# Patient Record
Sex: MALE | Race: OTHER | Employment: OTHER | URBAN - METROPOLITAN AREA
[De-identification: names, ages, dates, MRNs, and addresses within clinical notes are randomized per-mention and may not be internally consistent; named-entity substitution may affect disease eponyms.]

---

## 2023-01-01 LAB
BUN BLDV-MCNC: 12 MG/DL
CALCIUM SERPL-MCNC: 8.8 MG/DL
CHLORIDE BLD-SCNC: 96 MMOL/L
CO2: 27 MMOL/L
CREAT SERPL-MCNC: 0.5 MG/DL
EGFR: NORMAL
GLUCOSE BLD-MCNC: 95 MG/DL
POTASSIUM SERPL-SCNC: 4.4 MMOL/L
SODIUM BLD-SCNC: 132 MMOL/L

## 2023-11-24 ENCOUNTER — APPOINTMENT (OUTPATIENT)
Dept: GENERAL RADIOLOGY | Age: 83
End: 2023-11-24
Payer: MEDICARE

## 2023-11-24 ENCOUNTER — HOSPITAL ENCOUNTER (INPATIENT)
Age: 83
LOS: 7 days | Discharge: NURSING, REHAB OR LONG-TERM CARE | End: 2023-12-01
Attending: EMERGENCY MEDICINE | Admitting: INTERNAL MEDICINE
Payer: MEDICARE

## 2023-11-24 ENCOUNTER — APPOINTMENT (OUTPATIENT)
Dept: CT IMAGING | Age: 83
End: 2023-11-24
Attending: EMERGENCY MEDICINE
Payer: MEDICARE

## 2023-11-24 DIAGNOSIS — R41.82 ALTERED MENTAL STATUS, UNSPECIFIED ALTERED MENTAL STATUS TYPE: Primary | ICD-10-CM

## 2023-11-24 DIAGNOSIS — E16.2 HYPOGLYCEMIA: ICD-10-CM

## 2023-11-24 DIAGNOSIS — R06.03 RESPIRATORY DISTRESS: ICD-10-CM

## 2023-11-24 DIAGNOSIS — I48.91 ATRIAL FIBRILLATION WITH RAPID VENTRICULAR RESPONSE (HCC): ICD-10-CM

## 2023-11-24 DIAGNOSIS — J96.91 RESPIRATORY FAILURE WITH HYPOXIA, UNSPECIFIED CHRONICITY (HCC): ICD-10-CM

## 2023-11-24 LAB
ALBUMIN SERPL-MCNC: 3.4 G/DL (ref 3.5–4.6)
ALP SERPL-CCNC: 137 U/L (ref 35–104)
ALT SERPL-CCNC: 23 U/L (ref 0–41)
AMPHET UR QL SCN: ABNORMAL
ANION GAP SERPL CALCULATED.3IONS-SCNC: 18 MEQ/L (ref 9–15)
AST SERPL-CCNC: 68 U/L (ref 0–40)
BACTERIA URNS QL MICRO: NEGATIVE /HPF
BARBITURATES UR QL SCN: ABNORMAL
BASE EXCESS ARTERIAL: -6 (ref -3–3)
BASOPHILS # BLD: 0 K/UL (ref 0–0.2)
BASOPHILS NFR BLD: 0.2 %
BENZODIAZ UR QL SCN: ABNORMAL
BILIRUB SERPL-MCNC: 1.2 MG/DL (ref 0.2–0.7)
BILIRUB UR QL STRIP: ABNORMAL
BUN SERPL-MCNC: 22 MG/DL (ref 8–23)
CALCIUM IONIZED: 1.27 MMOL/L (ref 1.12–1.32)
CALCIUM SERPL-MCNC: 9.6 MG/DL (ref 8.5–9.9)
CANNABINOIDS UR QL SCN: ABNORMAL
CHLORIDE SERPL-SCNC: 101 MEQ/L (ref 95–107)
CLARITY UR: CLEAR
CO2 SERPL-SCNC: 19 MEQ/L (ref 20–31)
COARSE GRAN CASTS #/AREA URNS LPF: ABNORMAL /LPF (ref 0–5)
COCAINE UR QL SCN: ABNORMAL
COLOR UR: ABNORMAL
CREAT SERPL-MCNC: 0.82 MG/DL (ref 0.7–1.2)
DRUG SCREEN COMMENT UR-IMP: ABNORMAL
EOSINOPHIL # BLD: 0 K/UL (ref 0–0.7)
EOSINOPHIL NFR BLD: 0.1 %
EPI CELLS #/AREA URNS AUTO: ABNORMAL /HPF (ref 0–5)
ERYTHROCYTE [DISTWIDTH] IN BLOOD BY AUTOMATED COUNT: 15.6 % (ref 11.5–14.5)
FENTANYL SCREEN, URINE: POSITIVE
GLOBULIN SER CALC-MCNC: 4.4 G/DL (ref 2.3–3.5)
GLUCOSE BLD-MCNC: 107 MG/DL (ref 70–99)
GLUCOSE BLD-MCNC: 116 MG/DL (ref 70–99)
GLUCOSE BLD-MCNC: 204 MG/DL (ref 70–99)
GLUCOSE SERPL-MCNC: 192 MG/DL (ref 70–99)
GLUCOSE UR STRIP-MCNC: 250 MG/DL
HCO3 ARTERIAL: 20.2 MMOL/L (ref 21–29)
HCT VFR BLD AUTO: 41.9 % (ref 42–52)
HCT VFR BLD AUTO: 42 % (ref 41–53)
HGB BLD CALC-MCNC: 14.2 GM/DL (ref 13.5–17.5)
HGB BLD-MCNC: 13.2 G/DL (ref 14–18)
HGB UR QL STRIP: ABNORMAL
HYALINE CASTS #/AREA URNS AUTO: ABNORMAL /HPF (ref 0–5)
INR PPP: 1.3
KETONES UR STRIP-MCNC: 15 MG/DL
LACTATE BLDV-SCNC: 2.2 MMOL/L (ref 0.5–2.2)
LACTATE: 1.2 MMOL/L (ref 0.4–2)
LEUKOCYTE ESTERASE UR QL STRIP: ABNORMAL
LYMPHOCYTES # BLD: 0.8 K/UL (ref 1–4.8)
LYMPHOCYTES NFR BLD: 8.8 %
MCH RBC QN AUTO: 29.9 PG (ref 27–31.3)
MCHC RBC AUTO-ENTMCNC: 31.5 % (ref 33–37)
MCV RBC AUTO: 94.8 FL (ref 79–92.2)
METHADONE UR QL SCN: ABNORMAL
MONOCYTES # BLD: 0.7 K/UL (ref 0.2–0.8)
MONOCYTES NFR BLD: 7.9 %
NEUTROPHILS # BLD: 7.5 K/UL (ref 1.4–6.5)
NEUTS SEG NFR BLD: 82.7 %
NITRITE UR QL STRIP: NEGATIVE
O2 SAT, ARTERIAL: 100 % (ref 93–100)
OPIATES UR QL SCN: ABNORMAL
OXYCODONE UR QL SCN: ABNORMAL
PCO2 ARTERIAL: 39 MM HG (ref 35–45)
PCP UR QL SCN: ABNORMAL
PERFORMED ON: ABNORMAL
PH ARTERIAL: 7.33 (ref 7.35–7.45)
PH UR STRIP: 5 [PH] (ref 5–9)
PLATELET # BLD AUTO: 235 K/UL (ref 130–400)
PO2 ARTERIAL: 448 MM HG (ref 75–108)
POC CHLORIDE: 109 MEQ/L (ref 99–110)
POC CREATININE: 0.6 MG/DL (ref 0.8–1.3)
POC SAMPLE TYPE: ABNORMAL
POTASSIUM SERPL-SCNC: 3.9 MEQ/L (ref 3.5–5.1)
POTASSIUM SERPL-SCNC: 4.2 MEQ/L (ref 3.4–4.9)
PROCALCITONIN SERPL IA-MCNC: 0.11 NG/ML (ref 0–0.15)
PROPOXYPH UR QL SCN: ABNORMAL
PROT SERPL-MCNC: 7.8 G/DL (ref 6.3–8)
PROT UR STRIP-MCNC: 100 MG/DL
PROTHROMBIN TIME: 16.7 SEC (ref 12.3–14.9)
RBC # BLD AUTO: 4.42 M/UL (ref 4.7–6.1)
RBC #/AREA URNS HPF: ABNORMAL /HPF (ref 0–2)
SODIUM BLD-SCNC: 139 MEQ/L (ref 136–145)
SODIUM SERPL-SCNC: 138 MEQ/L (ref 135–144)
SP GR UR STRIP: 1.02 (ref 1–1.03)
TCO2 ARTERIAL: 21 MMOL/L (ref 21–32)
TROPONIN, HIGH SENSITIVITY: 26 NG/L (ref 0–19)
URINE REFLEX TO CULTURE: YES
UROBILINOGEN UR STRIP-ACNC: 1 E.U./DL
WBC # BLD AUTO: 9.1 K/UL (ref 4.8–10.8)
WBC #/AREA URNS AUTO: ABNORMAL /HPF (ref 0–5)

## 2023-11-24 PROCEDURE — 81001 URINALYSIS AUTO W/SCOPE: CPT

## 2023-11-24 PROCEDURE — 82435 ASSAY OF BLOOD CHLORIDE: CPT

## 2023-11-24 PROCEDURE — 87086 URINE CULTURE/COLONY COUNT: CPT

## 2023-11-24 PROCEDURE — 93005 ELECTROCARDIOGRAM TRACING: CPT | Performed by: EMERGENCY MEDICINE

## 2023-11-24 PROCEDURE — 84295 ASSAY OF SERUM SODIUM: CPT

## 2023-11-24 PROCEDURE — 6370000000 HC RX 637 (ALT 250 FOR IP): Performed by: INTERNAL MEDICINE

## 2023-11-24 PROCEDURE — 82565 ASSAY OF CREATININE: CPT

## 2023-11-24 PROCEDURE — 2000000000 HC ICU R&B

## 2023-11-24 PROCEDURE — 2500000003 HC RX 250 WO HCPCS: Performed by: INTERNAL MEDICINE

## 2023-11-24 PROCEDURE — 36415 COLL VENOUS BLD VENIPUNCTURE: CPT

## 2023-11-24 PROCEDURE — 94640 AIRWAY INHALATION TREATMENT: CPT

## 2023-11-24 PROCEDURE — 2700000000 HC OXYGEN THERAPY PER DAY

## 2023-11-24 PROCEDURE — 2500000003 HC RX 250 WO HCPCS

## 2023-11-24 PROCEDURE — 85025 COMPLETE CBC W/AUTO DIFF WBC: CPT

## 2023-11-24 PROCEDURE — 84145 PROCALCITONIN (PCT): CPT

## 2023-11-24 PROCEDURE — 71045 X-RAY EXAM CHEST 1 VIEW: CPT

## 2023-11-24 PROCEDURE — 82533 TOTAL CORTISOL: CPT

## 2023-11-24 PROCEDURE — 2580000003 HC RX 258: Performed by: EMERGENCY MEDICINE

## 2023-11-24 PROCEDURE — 84132 ASSAY OF SERUM POTASSIUM: CPT

## 2023-11-24 PROCEDURE — 80053 COMPREHEN METABOLIC PANEL: CPT

## 2023-11-24 PROCEDURE — 70450 CT HEAD/BRAIN W/O DYE: CPT

## 2023-11-24 PROCEDURE — A4216 STERILE WATER/SALINE, 10 ML: HCPCS | Performed by: INTERNAL MEDICINE

## 2023-11-24 PROCEDURE — 2500000003 HC RX 250 WO HCPCS: Performed by: EMERGENCY MEDICINE

## 2023-11-24 PROCEDURE — 80307 DRUG TEST PRSMV CHEM ANLYZR: CPT

## 2023-11-24 PROCEDURE — 82803 BLOOD GASES ANY COMBINATION: CPT

## 2023-11-24 PROCEDURE — 99285 EMERGENCY DEPT VISIT HI MDM: CPT

## 2023-11-24 PROCEDURE — 94002 VENT MGMT INPAT INIT DAY: CPT

## 2023-11-24 PROCEDURE — 84484 ASSAY OF TROPONIN QUANT: CPT

## 2023-11-24 PROCEDURE — 87040 BLOOD CULTURE FOR BACTERIA: CPT

## 2023-11-24 PROCEDURE — 5A1945Z RESPIRATORY VENTILATION, 24-96 CONSECUTIVE HOURS: ICD-10-PCS | Performed by: INTERNAL MEDICINE

## 2023-11-24 PROCEDURE — 96365 THER/PROPH/DIAG IV INF INIT: CPT

## 2023-11-24 PROCEDURE — 51798 US URINE CAPACITY MEASURE: CPT

## 2023-11-24 PROCEDURE — 36600 WITHDRAWAL OF ARTERIAL BLOOD: CPT

## 2023-11-24 PROCEDURE — 83036 HEMOGLOBIN GLYCOSYLATED A1C: CPT

## 2023-11-24 PROCEDURE — 72125 CT NECK SPINE W/O DYE: CPT

## 2023-11-24 PROCEDURE — 6360000002 HC RX W HCPCS: Performed by: INTERNAL MEDICINE

## 2023-11-24 PROCEDURE — 0BH17EZ INSERTION OF ENDOTRACHEAL AIRWAY INTO TRACHEA, VIA NATURAL OR ARTIFICIAL OPENING: ICD-10-PCS | Performed by: INTERNAL MEDICINE

## 2023-11-24 PROCEDURE — 85610 PROTHROMBIN TIME: CPT

## 2023-11-24 PROCEDURE — 6360000002 HC RX W HCPCS: Performed by: EMERGENCY MEDICINE

## 2023-11-24 PROCEDURE — 82330 ASSAY OF CALCIUM: CPT

## 2023-11-24 PROCEDURE — 96376 TX/PRO/DX INJ SAME DRUG ADON: CPT

## 2023-11-24 PROCEDURE — 96375 TX/PRO/DX INJ NEW DRUG ADDON: CPT

## 2023-11-24 PROCEDURE — 2580000003 HC RX 258: Performed by: INTERNAL MEDICINE

## 2023-11-24 PROCEDURE — 31500 INSERT EMERGENCY AIRWAY: CPT

## 2023-11-24 PROCEDURE — 85014 HEMATOCRIT: CPT

## 2023-11-24 PROCEDURE — 83605 ASSAY OF LACTIC ACID: CPT

## 2023-11-24 RX ORDER — PROPOFOL 10 MG/ML
5-50 INJECTION, EMULSION INTRAVENOUS CONTINUOUS
Status: DISCONTINUED | OUTPATIENT
Start: 2023-11-24 | End: 2023-11-27

## 2023-11-24 RX ORDER — POLYETHYLENE GLYCOL 3350 17 G/17G
17 POWDER, FOR SOLUTION ORAL DAILY PRN
Status: DISCONTINUED | OUTPATIENT
Start: 2023-11-24 | End: 2023-11-26

## 2023-11-24 RX ORDER — DILTIAZEM HYDROCHLORIDE 5 MG/ML
INJECTION INTRAVENOUS
Status: COMPLETED
Start: 2023-11-24 | End: 2023-11-24

## 2023-11-24 RX ORDER — DILTIAZEM HYDROCHLORIDE 5 MG/ML
10 INJECTION INTRAVENOUS ONCE
Status: COMPLETED | OUTPATIENT
Start: 2023-11-24 | End: 2023-11-24

## 2023-11-24 RX ORDER — SODIUM CHLORIDE 9 MG/ML
INJECTION, SOLUTION INTRAVENOUS PRN
Status: DISCONTINUED | OUTPATIENT
Start: 2023-11-24 | End: 2023-12-01 | Stop reason: HOSPADM

## 2023-11-24 RX ORDER — DEXTROSE MONOHYDRATE 100 MG/ML
INJECTION, SOLUTION INTRAVENOUS CONTINUOUS PRN
Status: DISCONTINUED | OUTPATIENT
Start: 2023-11-24 | End: 2023-12-01 | Stop reason: HOSPADM

## 2023-11-24 RX ORDER — DEXTROSE AND SODIUM CHLORIDE 5; .9 G/100ML; G/100ML
INJECTION, SOLUTION INTRAVENOUS CONTINUOUS
Status: DISCONTINUED | OUTPATIENT
Start: 2023-11-24 | End: 2023-11-27

## 2023-11-24 RX ORDER — ENOXAPARIN SODIUM 100 MG/ML
30 INJECTION SUBCUTANEOUS DAILY
Status: DISCONTINUED | OUTPATIENT
Start: 2023-11-24 | End: 2023-11-24

## 2023-11-24 RX ORDER — INSULIN LISPRO 100 [IU]/ML
0-8 INJECTION, SOLUTION INTRAVENOUS; SUBCUTANEOUS EVERY 4 HOURS
Status: DISCONTINUED | OUTPATIENT
Start: 2023-11-24 | End: 2023-11-27

## 2023-11-24 RX ORDER — SODIUM CHLORIDE 9 MG/ML
INJECTION, SOLUTION INTRAVENOUS CONTINUOUS
Status: DISCONTINUED | OUTPATIENT
Start: 2023-11-24 | End: 2023-11-25

## 2023-11-24 RX ORDER — ACETAMINOPHEN 325 MG/1
650 TABLET ORAL EVERY 6 HOURS PRN
Status: DISCONTINUED | OUTPATIENT
Start: 2023-11-24 | End: 2023-12-01 | Stop reason: HOSPADM

## 2023-11-24 RX ORDER — IPRATROPIUM BROMIDE AND ALBUTEROL SULFATE 2.5; .5 MG/3ML; MG/3ML
1 SOLUTION RESPIRATORY (INHALATION)
Status: DISCONTINUED | OUTPATIENT
Start: 2023-11-24 | End: 2023-11-27

## 2023-11-24 RX ORDER — ONDANSETRON 4 MG/1
4 TABLET, ORALLY DISINTEGRATING ORAL EVERY 8 HOURS PRN
Status: DISCONTINUED | OUTPATIENT
Start: 2023-11-24 | End: 2023-12-01 | Stop reason: HOSPADM

## 2023-11-24 RX ORDER — SODIUM CHLORIDE 0.9 % (FLUSH) 0.9 %
10 SYRINGE (ML) INJECTION EVERY 12 HOURS SCHEDULED
Status: DISCONTINUED | OUTPATIENT
Start: 2023-11-24 | End: 2023-12-01 | Stop reason: HOSPADM

## 2023-11-24 RX ORDER — ENOXAPARIN SODIUM 100 MG/ML
1 INJECTION SUBCUTANEOUS 2 TIMES DAILY
Status: DISCONTINUED | OUTPATIENT
Start: 2023-11-25 | End: 2023-11-24 | Stop reason: ALTCHOICE

## 2023-11-24 RX ORDER — DILTIAZEM HYDROCHLORIDE 5 MG/ML
20 INJECTION INTRAVENOUS ONCE
Status: COMPLETED | OUTPATIENT
Start: 2023-11-24 | End: 2023-11-24

## 2023-11-24 RX ORDER — ATROPINE SULFATE 0.1 MG/ML
INJECTION INTRAVENOUS DAILY PRN
Status: COMPLETED | OUTPATIENT
Start: 2023-11-24 | End: 2023-11-24

## 2023-11-24 RX ORDER — ONDANSETRON 2 MG/ML
4 INJECTION INTRAMUSCULAR; INTRAVENOUS EVERY 6 HOURS PRN
Status: DISCONTINUED | OUTPATIENT
Start: 2023-11-24 | End: 2023-12-01 | Stop reason: HOSPADM

## 2023-11-24 RX ORDER — ACETAMINOPHEN 650 MG/1
650 SUPPOSITORY RECTAL EVERY 6 HOURS PRN
Status: DISCONTINUED | OUTPATIENT
Start: 2023-11-24 | End: 2023-12-01 | Stop reason: HOSPADM

## 2023-11-24 RX ORDER — CHLORHEXIDINE GLUCONATE ORAL RINSE 1.2 MG/ML
15 SOLUTION DENTAL 2 TIMES DAILY
Status: DISCONTINUED | OUTPATIENT
Start: 2023-11-24 | End: 2023-11-27

## 2023-11-24 RX ORDER — SODIUM CHLORIDE 0.9 % (FLUSH) 0.9 %
10 SYRINGE (ML) INJECTION PRN
Status: DISCONTINUED | OUTPATIENT
Start: 2023-11-24 | End: 2023-12-01 | Stop reason: HOSPADM

## 2023-11-24 RX ORDER — COSYNTROPIN 0.25 MG/ML
250 INJECTION, POWDER, FOR SOLUTION INTRAMUSCULAR; INTRAVENOUS ONCE
Status: COMPLETED | OUTPATIENT
Start: 2023-11-24 | End: 2023-11-24

## 2023-11-24 RX ADMIN — ENOXAPARIN SODIUM 30 MG: 100 INJECTION SUBCUTANEOUS at 17:03

## 2023-11-24 RX ADMIN — PIPERACILLIN AND TAZOBACTAM 3375 MG: 3; .375 INJECTION, POWDER, LYOPHILIZED, FOR SOLUTION INTRAVENOUS at 17:30

## 2023-11-24 RX ADMIN — DILTIAZEM HYDROCHLORIDE 5 MG/HR: 5 INJECTION, SOLUTION INTRAVENOUS at 14:56

## 2023-11-24 RX ADMIN — DEXTROSE AND SODIUM CHLORIDE: 5; 900 INJECTION, SOLUTION INTRAVENOUS at 18:32

## 2023-11-24 RX ADMIN — APIXABAN 5 MG: 5 TABLET, FILM COATED ORAL at 20:02

## 2023-11-24 RX ADMIN — Medication 10 ML: at 20:02

## 2023-11-24 RX ADMIN — ATROPINE SULFATE 1 MG: 0.1 INJECTION INTRAVENOUS at 13:45

## 2023-11-24 RX ADMIN — DILTIAZEM HYDROCHLORIDE 20 MG: 5 INJECTION INTRAVENOUS at 13:50

## 2023-11-24 RX ADMIN — FENTANYL CITRATE 100 MCG/HR: 50 INJECTION, SOLUTION INTRAMUSCULAR; INTRAVENOUS at 17:02

## 2023-11-24 RX ADMIN — PROPOFOL 20 MCG/KG/MIN: 10 INJECTION, EMULSION INTRAVENOUS at 16:32

## 2023-11-24 RX ADMIN — SODIUM CHLORIDE, PRESERVATIVE FREE 20 MG: 5 INJECTION INTRAVENOUS at 17:03

## 2023-11-24 RX ADMIN — ATROPINE SULFATE 1 MG: 0.1 INJECTION INTRAVENOUS at 13:36

## 2023-11-24 RX ADMIN — 0.12% CHLORHEXIDINE GLUCONATE 15 ML: 1.2 RINSE ORAL at 20:02

## 2023-11-24 RX ADMIN — DILTIAZEM HYDROCHLORIDE 10 MG: 5 INJECTION INTRAVENOUS at 14:38

## 2023-11-24 RX ADMIN — SODIUM CHLORIDE: 9 INJECTION, SOLUTION INTRAVENOUS at 14:47

## 2023-11-24 RX ADMIN — PIPERACILLIN AND TAZOBACTAM 3375 MG: 3; .375 INJECTION, POWDER, LYOPHILIZED, FOR SOLUTION INTRAVENOUS at 22:02

## 2023-11-24 RX ADMIN — IPRATROPIUM BROMIDE AND ALBUTEROL SULFATE 1 DOSE: 2.5; .5 SOLUTION RESPIRATORY (INHALATION) at 20:17

## 2023-11-24 RX ADMIN — COSYNTROPIN 250 MCG: 0.25 INJECTION, POWDER, LYOPHILIZED, FOR SOLUTION INTRAMUSCULAR; INTRAVENOUS at 21:03

## 2023-11-24 ASSESSMENT — PULMONARY FUNCTION TESTS
PIF_VALUE: 18
PIF_VALUE: 17
PIF_VALUE: 17
PIF_VALUE: 18
PIF_VALUE: 22
PIF_VALUE: 20
PIF_VALUE: 17
PIF_VALUE: 20
PIF_VALUE: 18
PIF_VALUE: 20
PIF_VALUE: 17
PIF_VALUE: 18
PIF_VALUE: 18
PIF_VALUE: 19
PIF_VALUE: 18
PIF_VALUE: 17
PIF_VALUE: 17
PIF_VALUE: 18

## 2023-11-24 NOTE — ED NOTES
Sam Lazo is Dr. Hebert Kruse that called back not [address] [place] [address], [place]  11/24/23 7339

## 2023-11-24 NOTE — ED TRIAGE NOTES
Pt was found down and unresponsive. Unknown downtime. One touch per squad was 28 with heart rate in the 160's.  Squad administered 2 mg narcan IM, 500 ml of  D10, 100 mcg of fentanyl 200 mg ketamine and 70 mg of rocuronium prior to  cardioverting at 100 joules

## 2023-11-24 NOTE — ED NOTES
26 xray lifted pt up to place board under pt . Pt in afib with rvr 150-180. Pt spo2 began to drop to 40's. Resp and Dr Nelida Kruse at bedside and vent became disconnected. Pt placed back on vent but hr remained low and pt had weak pulses.      Phoebe Quinn., RN  11/24/23 9457

## 2023-11-24 NOTE — ED PROVIDER NOTES
CHOLECALCIFEROL (VITAMIN D3) 50 MCG (2000 UT) CAPS    Take 1 capsule by mouth daily    DOXAZOSIN (CARDURA) 2 MG TABLET    Take 2 mg by mouth nightly    INSULIN GLARGINE (LANTUS) 100 UNIT/ML INJECTION VIAL    Inject into the skin nightly    ISOSORBIDE MONONITRATE (IMDUR) 30 MG EXTENDED RELEASE TABLET    Take 30 mg by mouth daily    LEVOTHYROXINE (SYNTHROID) 25 MCG TABLET    Take 25 mcg by mouth Daily    MAGNESIUM OXIDE (MAG-OX) 400 MG TABLET    Take 400 mg by mouth three times daily    METFORMIN (GLUCOPHAGE) 1000 MG TABLET    Take 1,000 mg by mouth 2 times daily (with meals)    PIOGLITAZONE (ACTOS) 30 MG TABLET    Take 30 mg by mouth daily       ALLERGIES     Lisinopril    FAMILY HISTORY     No family history on file. SOCIAL HISTORY       Social History     Socioeconomic History    Marital status:    Tobacco Use    Smoking status: Former    Smokeless tobacco: Never   Vaping Use    Vaping Use: Never used   Substance and Sexual Activity    Alcohol use: No    Drug use: No       SCREENINGS    Ault Coma Scale  Eye Opening: None  Best Verbal Response: None  Best Motor Response: None  Ault Coma Scale Score: 3  OPO Notified: Not indicated          PHYSICAL EXAM    (up to 7 for level 4, 8 or more for level 5)     ED Triage Vitals   BP Temp Temp src Pulse Resp SpO2 Height Weight   -- -- -- -- -- -- -- --       Physical Exam  Vitals and nursing note reviewed. Constitutional:       General: He is not in acute distress. Appearance: Normal appearance. He is well-developed. He is ill-appearing. Comments: Intubated, sedated   HENT:      Head: Normocephalic and atraumatic. Mouth/Throat:      Mouth: Mucous membranes are moist.      Pharynx: Oropharynx is clear. Eyes:      Extraocular Movements: Extraocular movements intact. Conjunctiva/sclera: Conjunctivae normal.   Cardiovascular:      Rate and Rhythm: Normal rate and regular rhythm.    Pulmonary:      Effort: Pulmonary effort is normal. Breath sounds: Normal breath sounds. Abdominal:      General: Bowel sounds are normal.      Palpations: Abdomen is soft. Tenderness: There is no abdominal tenderness. There is no guarding or rebound. Musculoskeletal:         General: No deformity. Normal range of motion. Cervical back: Normal range of motion and neck supple. Skin:     General: Skin is warm and dry. Capillary Refill: Capillary refill takes less than 2 seconds. Neurological:      General: No focal deficit present. Cranial Nerves: No cranial nerve deficit. Comments: sedated   Psychiatric:         Thought Content: Thought content normal.         DIAGNOSTIC RESULTS     EKG: All EKG's are interpreted by the Emergency Department Physician who either signs or Co-signs this chart in the absence of a cardiologist.    NSR, rate 100, normal intervals, no ST elevation/ depression    REPEAT:  Afib w RVR, rate 192, normal intervals, no ST elevation/ depression    RADIOLOGY:   Non-plain film images such as CT, Ultrasound and MRI are read by the radiologist. [facility] radiographic images are visualized and preliminarily interpreted by the emergency physician with the below findings:    CXR- ETT in good position; neg acute    Interpretation per the Radiologist below, if available at the time of this note:    CT Head W/O Contrast   Final Result   No acute intracranial abnormality. Moderate chronic senescent change      Somewhat advanced changes of acute sinusitis         XR CHEST PORTABLE   Final Result   Cardiomegaly. No evidence for pneumothorax. Other findings as above.          CT CSpine W/O Contrast    (Results Pending)       LABS:  Labs Reviewed   CBC WITH AUTO DIFFERENTIAL - Abnormal; Notable for the following components:       Result Value    RBC 4.42 (*)     Hemoglobin 13.2 (*)     Hematocrit 41.9 (*)     MCV 94.8 (*)     MCHC 31.5 (*)     RDW 15.6 (*)     Neutrophils Absolute 7.5 (*)     Lymphocytes Absolute 0.8 (*)     All other

## 2023-11-24 NOTE — CARE COORDINATION
This nurse contacted the patient's daughter Delia Palomino and she is here in the family room. Dr Holli Bentley is in the room. She reported to me that the patient lives with his wife and both have \"severe dementia\" and that [place] mom thinks their puppy is a baby\" and lets it void and defecate in the house\"  which is very unsanitary but they will not permit her to assist them. She states that aps has been involved but so far no action has been taken.

## 2023-11-24 NOTE — CARE COORDINATION
The patient's daughter Beata Alejandro had left to attend to family needs. This nurse did call her but she is driving. I informed her that the patient was going to icu and how to get there. She stated that she is planning to come in to see the patient. Ms Wilian Ibrahim reports that her parents [place] in [place]\" and are resistant to help. This nurse did contact aps but received a secure voicemail. I left the patient's information with his room number and phone number to icu. I also [facility] the [facility] privacy code as well.

## 2023-11-24 NOTE — ED NOTES
18 ogt place with gastric content return and loud air bolus over epigastric area.  Xray at bedside      Cindy Linea., RN  11/24/23 0674       Cindy Joaquina., RN  11/24/23 1787

## 2023-11-24 NOTE — ED NOTES
5330 Located within [facility] [address] Medication Reconciliation Note      Medication reconciliation was attempted for patient Manju Sheikh 1940. Admit date: 2023  Consult: No    Med rec status: Medrec Status: Completed    Information provided by: MED REC HISTORY: Review of EMR    Pharmacy:  [address] [facility] [address]), Phone [phone], maintence medications through [address] [facility]), Phone [phone]     Pharmacy Updated: Yes    MEDICATIONS     Prior to Admission medications    Medication Sig Start Date End Date Taking?  Authorizing Provider   carboxymethylcellulose PF (REFRESH PLUS) 0.5 % SOLN ophthalmic solution Place 1 drop into both eyes 3 times daily 10/22/22   Susan Apgar, MD   dilTIAZem (CARDIZEM CD) 180 MG extended release capsule Take 1 capsule by mouth 2 times daily 10/22/22 11/24/23  Susan Apgar, MD   insulin glargine (LANTUS) 100 UNIT/ML injection vial Inject 10 Units into the skin nightly    Ty Escobar MD   levothyroxine (SYNTHROID) 25 MCG tablet Take 25 mcg by mouth Daily    Ty Escobar MD   isosorbide mononitrate (IMDUR) 30 MG extended release tablet Take 30 mg by mouth daily    Ty Escobar MD   magnesium oxide (MAG-OX) 400 MG tablet Take 400 mg by mouth three times daily    Ty Escobar MD   doxazosin (CARDURA) 2 MG tablet Take 2 mg by mouth nightly    Ty Escobar MD   Cholecalciferol (VITAMIN D3) 50 MCG ( UT) CAPS Take 1 capsule by mouth daily    Ty Escobar MD   pioglitazone (ACTOS) 30 MG tablet Take 30 mg by mouth daily    Ty Escobar MD   atorvastatin (LIPITOR) 40 MG tablet Take 40 mg by mouth daily    Ty Escobar MD   metFORMIN (GLUCOPHAGE) 1000 MG tablet Take 1,000 mg by mouth 2 times daily (with meals)    Ty Escobar MD   aspirin 81 MG EC tablet Take 81 mg by mouth daily    Ty Escobar MD   apixaban

## 2023-11-24 NOTE — H&P
Hospital Medicine  History and Physical    Patient:  Mk Michael  MRN: [id number]    CHIEF COMPLAINT:    Chief Complaint   Patient presents with    Altered Mental Status     Pt found unresponsive       History Obtained From:  Patient, EMR  Primary Care Physician: SANTANA Cheung CNP    HISTORY OF PRESENT ILLNESS:   The patient is a 80 y.o. male with PMH of CAD, CHF, DMII, HTN, HLD who presents after being found down. History taken via ER. Patient was found home down for unknonwn amount of time. Daughter was unable to arouse him and called EMS. He was found to be hypoglycemic; glucose was given but they still rquired intubation for airway protection. Found to be bradycardic in the ER which converted to A fib with RVR after atropine. Past Medical History:      Diagnosis Date    CAD (coronary artery disease)     CHF (congestive heart failure) (HCC)     Diabetes mellitus ([address])     Hyperlipidemia     Hypertension        Past Surgical History:      Procedure Laterality Date    COLONOSCOPY         Medications Prior to Admission:    Prior to Admission medications    Medication Sig Start Date End Date Taking?  Authorizing Provider   carboxymethylcellulose PF (REFRESH PLUS) 0.5 % SOLN ophthalmic solution Place 1 drop into both eyes 3 times daily 10/22/22   Ella Rocha MD   insulin glargine (LANTUS) 100 UNIT/ML injection vial Inject into the skin nightly    Ty Escobar MD   levothyroxine (SYNTHROID) 25 MCG tablet Take 25 mcg by mouth Daily    Ty Escobar MD   isosorbide mononitrate (IMDUR) 30 MG extended release tablet Take 30 mg by mouth daily    Ty Escobar MD   magnesium oxide (MAG-OX) 400 MG tablet Take 400 mg by mouth three times daily    Ty Escobar MD   doxazosin (CARDURA) 2 MG tablet Take 2 mg by mouth nightly    yT Escobar MD   Cholecalciferol (VITAMIN D3) 50 MCG (2000 UT) CAPS Take 1 capsule by mouth daily    Ty Escobar MD

## 2023-11-24 NOTE — ED NOTES
Perfect Serve to [facility] Dr. Stanford Jc @[id number]     Anne Lou  11/24/23 2528

## 2023-11-25 ENCOUNTER — APPOINTMENT (OUTPATIENT)
Age: 83
End: 2023-11-25
Attending: INTERNAL MEDICINE
Payer: MEDICARE

## 2023-11-25 ENCOUNTER — APPOINTMENT (OUTPATIENT)
Dept: MRI IMAGING | Age: 83
End: 2023-11-25
Payer: MEDICARE

## 2023-11-25 PROBLEM — E16.2 HYPOGLYCEMIA: Status: ACTIVE | Noted: 2023-11-25

## 2023-11-25 PROBLEM — G93.41 ACUTE METABOLIC ENCEPHALOPATHY: Status: ACTIVE | Noted: 2023-11-25

## 2023-11-25 PROBLEM — R41.82 ALTERED MENTAL STATUS: Status: ACTIVE | Noted: 2023-11-25

## 2023-11-25 LAB
ANION GAP SERPL CALCULATED.3IONS-SCNC: 14 MEQ/L (ref 9–15)
BASE EXCESS ARTERIAL: -1 (ref -3–3)
BASE EXCESS ARTERIAL: -1 (ref -3–3)
BASOPHILS # BLD: 0 K/UL (ref 0–0.2)
BASOPHILS NFR BLD: 0.1 %
BUN SERPL-MCNC: 22 MG/DL (ref 8–23)
CALCIUM IONIZED: 1.32 MMOL/L (ref 1.12–1.32)
CALCIUM IONIZED: 1.35 MMOL/L (ref 1.12–1.32)
CALCIUM SERPL-MCNC: 9.1 MG/DL (ref 8.5–9.9)
CHLORIDE SERPL-SCNC: 106 MEQ/L (ref 95–107)
CO2 SERPL-SCNC: 19 MEQ/L (ref 20–31)
CORTISOL COLLECTION INFO: ABNORMAL
CORTISOL: 20.6 UG/DL (ref 2.5–19.5)
CORTISOL: 22.3 UG/DL (ref 2.5–19.5)
CORTISOL: 23.8 UG/DL (ref 2.5–19.5)
CREAT SERPL-MCNC: 0.76 MG/DL (ref 0.7–1.2)
ECHO AV AREA PEAK VELOCITY: 1.4 CM2
ECHO AV AREA VTI: 1.7 CM2
ECHO AV AREA/BSA PEAK VELOCITY: 0.7 CM2/M2
ECHO AV AREA/BSA VTI: 0.9 CM2/M2
ECHO AV CUSP MM: 1.6 CM
ECHO AV MEAN GRADIENT: 5 MMHG
ECHO AV MEAN VELOCITY: 1.1 M/S
ECHO AV PEAK GRADIENT: 8 MMHG
ECHO AV PEAK VELOCITY: 1.6 M/S
ECHO AV VELOCITY RATIO: 0.44
ECHO AV VTI: 27.9 CM
ECHO BSA: 1.48 M2
ECHO EST RA PRESSURE: 3 MMHG
ECHO LA DIAMETER INDEX: 2.1 CM/M2
ECHO LA DIAMETER: 4.2 CM
ECHO LA VOL A-L A2C: 108 ML (ref 18–58)
ECHO LA VOL A-L A4C: 98 ML (ref 18–58)
ECHO LA VOL MOD A2C: 104 ML (ref 18–58)
ECHO LA VOL MOD A4C: 95 ML (ref 18–58)
ECHO LA VOLUME AREA LENGTH: 105 ML
ECHO LA VOLUME INDEX A-L A2C: 54 ML/M2 (ref 16–34)
ECHO LA VOLUME INDEX A-L A4C: 49 ML/M2 (ref 16–34)
ECHO LA VOLUME INDEX AREA LENGTH: 53 ML/M2 (ref 16–34)
ECHO LA VOLUME INDEX MOD A2C: 52 ML/M2 (ref 16–34)
ECHO LA VOLUME INDEX MOD A4C: 48 ML/M2 (ref 16–34)
ECHO LV E' LATERAL VELOCITY: 5 CM/S
ECHO LV E' SEPTAL VELOCITY: 4 CM/S
ECHO LV EDV A2C: 83 ML
ECHO LV EDV A4C: 95 ML
ECHO LV EDV BP: 91 ML (ref 67–155)
ECHO LV EDV INDEX A4C: 48 ML/M2
ECHO LV EDV INDEX BP: 46 ML/M2
ECHO LV EDV NDEX A2C: 42 ML/M2
ECHO LV EJECTION FRACTION A2C: 61 %
ECHO LV EJECTION FRACTION A4C: 51 %
ECHO LV EJECTION FRACTION BIPLANE: 55 % (ref 55–100)
ECHO LV ESV A2C: 32 ML
ECHO LV ESV A4C: 47 ML
ECHO LV ESV BP: 40 ML (ref 22–58)
ECHO LV ESV INDEX A2C: 16 ML/M2
ECHO LV ESV INDEX A4C: 24 ML/M2
ECHO LV ESV INDEX BP: 20 ML/M2
ECHO LV FRACTIONAL SHORTENING: 17 % (ref 28–44)
ECHO LV INTERNAL DIMENSION DIASTOLE INDEX: 2.35 CM/M2
ECHO LV INTERNAL DIMENSION DIASTOLIC: 4.7 CM (ref 4.2–5.9)
ECHO LV INTERNAL DIMENSION SYSTOLIC INDEX: 1.95 CM/M2
ECHO LV INTERNAL DIMENSION SYSTOLIC: 3.9 CM
ECHO LV IVSD: 1.4 CM (ref 0.6–1)
ECHO LV IVSS: 1.3 CM
ECHO LV MASS 2D: 212 G (ref 88–224)
ECHO LV MASS INDEX 2D: 106 G/M2 (ref 49–115)
ECHO LV POSTERIOR WALL DIASTOLIC: 1 CM (ref 0.6–1)
ECHO LV POSTERIOR WALL SYSTOLIC: 1.3 CM
ECHO LV RELATIVE WALL THICKNESS RATIO: 0.43
ECHO LVOT AREA: 3.1 CM2
ECHO LVOT AV VTI INDEX: 0.53
ECHO LVOT DIAM: 2 CM
ECHO LVOT MEAN GRADIENT: 1 MMHG
ECHO LVOT PEAK GRADIENT: 2 MMHG
ECHO LVOT PEAK VELOCITY: 0.7 M/S
ECHO LVOT STROKE VOLUME INDEX: 23.2 ML/M2
ECHO LVOT SV: 46.5 ML
ECHO LVOT VTI: 14.8 CM
ECHO MV A VELOCITY: 0.28 M/S
ECHO MV AREA VTI: 2.3 CM2
ECHO MV E DECELERATION TIME (DT): 219.4 MS
ECHO MV E VELOCITY: 1.18 M/S
ECHO MV E/A RATIO: 4.21
ECHO MV E/E' LATERAL: 23.6
ECHO MV E/E' RATIO (AVERAGED): 26.55
ECHO MV LVOT VTI INDEX: 1.38
ECHO MV MAX VELOCITY: 1.2 M/S
ECHO MV MEAN GRADIENT: 2 MMHG
ECHO MV MEAN VELOCITY: 0.6 M/S
ECHO MV PEAK GRADIENT: 5 MMHG
ECHO MV REGURGITANT ALIASING (NYQUIST) VELOCITY: 33 CM/S
ECHO MV REGURGITANT RADIUS PISA: 0.47 CM
ECHO MV REGURGITANT VELOCITY PISA: 5.1 M/S
ECHO MV REGURGITANT VTIA: 134.9 CM
ECHO MV VTI: 20.4 CM
ECHO PV MAX VELOCITY: 0.9 M/S
ECHO PV PEAK GRADIENT: 3 MMHG
ECHO RIGHT VENTRICULAR SYSTOLIC PRESSURE (RVSP): 48 MMHG
ECHO RV INTERNAL DIMENSION: 2.3 CM
ECHO RV TAPSE: 1.8 CM (ref 1.7–?)
ECHO TV REGURGITANT MAX VELOCITY: 3.37 M/S
ECHO TV REGURGITANT PEAK GRADIENT: 45 MMHG
EOSINOPHIL # BLD: 0 K/UL (ref 0–0.7)
EOSINOPHIL NFR BLD: 0.1 %
ERYTHROCYTE [DISTWIDTH] IN BLOOD BY AUTOMATED COUNT: 15.5 % (ref 11.5–14.5)
GLUCOSE BLD-MCNC: 123 MG/DL (ref 70–99)
GLUCOSE BLD-MCNC: 152 MG/DL (ref 70–99)
GLUCOSE BLD-MCNC: 164 MG/DL (ref 70–99)
GLUCOSE BLD-MCNC: 183 MG/DL (ref 70–99)
GLUCOSE BLD-MCNC: 190 MG/DL (ref 70–99)
GLUCOSE BLD-MCNC: 201 MG/DL (ref 70–99)
GLUCOSE BLD-MCNC: 201 MG/DL (ref 70–99)
GLUCOSE BLD-MCNC: 219 MG/DL (ref 70–99)
GLUCOSE SERPL-MCNC: 142 MG/DL (ref 70–99)
HBA1C MFR BLD: 5.2 % (ref 4.8–5.9)
HCO3 ARTERIAL: 23.8 MMOL/L (ref 21–29)
HCO3 ARTERIAL: 24.2 MMOL/L (ref 21–29)
HCT VFR BLD AUTO: 38 % (ref 41–53)
HCT VFR BLD AUTO: 40.7 % (ref 42–52)
HCT VFR BLD AUTO: 41 % (ref 41–53)
HGB BLD CALC-MCNC: 12.9 GM/DL (ref 13.5–17.5)
HGB BLD CALC-MCNC: 13.8 GM/DL (ref 13.5–17.5)
HGB BLD-MCNC: 13 G/DL (ref 14–18)
LACTATE: 1.04 MMOL/L (ref 0.4–2)
LACTATE: 1.12 MMOL/L (ref 0.4–2)
LYMPHOCYTES # BLD: 0.8 K/UL (ref 1–4.8)
LYMPHOCYTES NFR BLD: 9.5 %
MCH RBC QN AUTO: 29.8 PG (ref 27–31.3)
MCHC RBC AUTO-ENTMCNC: 31.9 % (ref 33–37)
MCV RBC AUTO: 93.3 FL (ref 79–92.2)
MONOCYTES # BLD: 0.8 K/UL (ref 0.2–0.8)
MONOCYTES NFR BLD: 9.2 %
NEUTROPHILS # BLD: 6.9 K/UL (ref 1.4–6.5)
NEUTS SEG NFR BLD: 80.7 %
O2 SAT, ARTERIAL: 99 % (ref 93–100)
O2 SAT, ARTERIAL: 99 % (ref 93–100)
PCO2 ARTERIAL: 39 MM HG (ref 35–45)
PCO2 ARTERIAL: 44 MM HG (ref 35–45)
PERFORMED ON: ABNORMAL
PH ARTERIAL: 7.35 (ref 7.35–7.45)
PH ARTERIAL: 7.39 (ref 7.35–7.45)
PLATELET # BLD AUTO: 143 K/UL (ref 130–400)
PO2 ARTERIAL: 141 MM HG (ref 75–108)
PO2 ARTERIAL: 149 MM HG (ref 75–108)
POC CHLORIDE: 109 MEQ/L (ref 99–110)
POC CHLORIDE: 109 MEQ/L (ref 99–110)
POC CREATININE: 0.7 MG/DL (ref 0.8–1.3)
POC CREATININE: 0.7 MG/DL (ref 0.8–1.3)
POC FIO2: 40
POC FIO2: 40
POC SAMPLE TYPE: ABNORMAL
POC SAMPLE TYPE: ABNORMAL
POTASSIUM SERPL-SCNC: 3.9 MEQ/L (ref 3.5–5.1)
POTASSIUM SERPL-SCNC: 3.9 MEQ/L (ref 3.5–5.1)
POTASSIUM SERPL-SCNC: 4.5 MEQ/L (ref 3.4–4.9)
PROCALCITONIN SERPL IA-MCNC: 0.11 NG/ML (ref 0–0.15)
RBC # BLD AUTO: 4.36 M/UL (ref 4.7–6.1)
SODIUM BLD-SCNC: 141 MEQ/L (ref 136–145)
SODIUM BLD-SCNC: 143 MEQ/L (ref 136–145)
SODIUM SERPL-SCNC: 139 MEQ/L (ref 135–144)
TCO2 ARTERIAL: 25 MMOL/L (ref 21–32)
TCO2 ARTERIAL: 26 MMOL/L (ref 21–32)
WBC # BLD AUTO: 8.5 K/UL (ref 4.8–10.8)

## 2023-11-25 PROCEDURE — 94003 VENT MGMT INPAT SUBQ DAY: CPT

## 2023-11-25 PROCEDURE — 84295 ASSAY OF SERUM SODIUM: CPT

## 2023-11-25 PROCEDURE — 82565 ASSAY OF CREATININE: CPT

## 2023-11-25 PROCEDURE — 6370000000 HC RX 637 (ALT 250 FOR IP): Performed by: INTERNAL MEDICINE

## 2023-11-25 PROCEDURE — 82435 ASSAY OF BLOOD CHLORIDE: CPT

## 2023-11-25 PROCEDURE — 2580000003 HC RX 258: Performed by: INTERNAL MEDICINE

## 2023-11-25 PROCEDURE — 83605 ASSAY OF LACTIC ACID: CPT

## 2023-11-25 PROCEDURE — 94640 AIRWAY INHALATION TREATMENT: CPT

## 2023-11-25 PROCEDURE — 94761 N-INVAS EAR/PLS OXIMETRY MLT: CPT

## 2023-11-25 PROCEDURE — 70551 MRI BRAIN STEM W/O DYE: CPT

## 2023-11-25 PROCEDURE — 85014 HEMATOCRIT: CPT

## 2023-11-25 PROCEDURE — 84145 PROCALCITONIN (PCT): CPT

## 2023-11-25 PROCEDURE — 80048 BASIC METABOLIC PNL TOTAL CA: CPT

## 2023-11-25 PROCEDURE — A4216 STERILE WATER/SALINE, 10 ML: HCPCS | Performed by: INTERNAL MEDICINE

## 2023-11-25 PROCEDURE — 99221 1ST HOSP IP/OBS SF/LOW 40: CPT | Performed by: INTERNAL MEDICINE

## 2023-11-25 PROCEDURE — 85025 COMPLETE CBC W/AUTO DIFF WBC: CPT

## 2023-11-25 PROCEDURE — 99223 1ST HOSP IP/OBS HIGH 75: CPT | Performed by: PSYCHIATRY & NEUROLOGY

## 2023-11-25 PROCEDURE — 51702 INSERT TEMP BLADDER CATH: CPT

## 2023-11-25 PROCEDURE — 2500000003 HC RX 250 WO HCPCS: Performed by: INTERNAL MEDICINE

## 2023-11-25 PROCEDURE — 2700000000 HC OXYGEN THERAPY PER DAY

## 2023-11-25 PROCEDURE — 2000000000 HC ICU R&B

## 2023-11-25 PROCEDURE — 82803 BLOOD GASES ANY COMBINATION: CPT

## 2023-11-25 PROCEDURE — 6360000002 HC RX W HCPCS: Performed by: INTERNAL MEDICINE

## 2023-11-25 PROCEDURE — 36600 WITHDRAWAL OF ARTERIAL BLOOD: CPT

## 2023-11-25 PROCEDURE — 93306 TTE W/DOPPLER COMPLETE: CPT

## 2023-11-25 PROCEDURE — 93306 TTE W/DOPPLER COMPLETE: CPT | Performed by: INTERNAL MEDICINE

## 2023-11-25 PROCEDURE — 82330 ASSAY OF CALCIUM: CPT

## 2023-11-25 PROCEDURE — 82948 REAGENT STRIP/BLOOD GLUCOSE: CPT

## 2023-11-25 PROCEDURE — 36415 COLL VENOUS BLD VENIPUNCTURE: CPT

## 2023-11-25 PROCEDURE — 51798 US URINE CAPACITY MEASURE: CPT

## 2023-11-25 PROCEDURE — 84132 ASSAY OF SERUM POTASSIUM: CPT

## 2023-11-25 PROCEDURE — 99291 CRITICAL CARE FIRST HOUR: CPT | Performed by: INTERNAL MEDICINE

## 2023-11-25 RX ORDER — METOPROLOL TARTRATE 50 MG/1
50 TABLET, FILM COATED ORAL 3 TIMES DAILY
Status: DISCONTINUED | OUTPATIENT
Start: 2023-11-25 | End: 2023-11-27

## 2023-11-25 RX ORDER — ALBUMIN (HUMAN) 12.5 G/50ML
25 SOLUTION INTRAVENOUS ONCE
Status: COMPLETED | OUTPATIENT
Start: 2023-11-26 | End: 2023-11-26

## 2023-11-25 RX ORDER — NOREPINEPHRINE BITARTRATE 0.06 MG/ML
1-100 INJECTION, SOLUTION INTRAVENOUS CONTINUOUS
Status: DISCONTINUED | OUTPATIENT
Start: 2023-11-26 | End: 2023-11-26

## 2023-11-25 RX ORDER — 0.9 % SODIUM CHLORIDE 0.9 %
500 INTRAVENOUS SOLUTION INTRAVENOUS ONCE
Status: COMPLETED | OUTPATIENT
Start: 2023-11-25 | End: 2023-11-25

## 2023-11-25 RX ADMIN — FENTANYL CITRATE 50 MCG/HR: 50 INJECTION, SOLUTION INTRAMUSCULAR; INTRAVENOUS at 17:18

## 2023-11-25 RX ADMIN — PROPOFOL 15 MCG/KG/MIN: 10 INJECTION, EMULSION INTRAVENOUS at 03:46

## 2023-11-25 RX ADMIN — IPRATROPIUM BROMIDE AND ALBUTEROL SULFATE 1 DOSE: 2.5; .5 SOLUTION RESPIRATORY (INHALATION) at 20:35

## 2023-11-25 RX ADMIN — DEXTROSE AND SODIUM CHLORIDE: 5; 900 INJECTION, SOLUTION INTRAVENOUS at 05:06

## 2023-11-25 RX ADMIN — IPRATROPIUM BROMIDE AND ALBUTEROL SULFATE 1 DOSE: 2.5; .5 SOLUTION RESPIRATORY (INHALATION) at 15:45

## 2023-11-25 RX ADMIN — PIPERACILLIN AND TAZOBACTAM 3375 MG: 3; .375 INJECTION, POWDER, LYOPHILIZED, FOR SOLUTION INTRAVENOUS at 22:34

## 2023-11-25 RX ADMIN — Medication 10 ML: at 21:09

## 2023-11-25 RX ADMIN — IPRATROPIUM BROMIDE AND ALBUTEROL SULFATE 1 DOSE: 2.5; .5 SOLUTION RESPIRATORY (INHALATION) at 04:28

## 2023-11-25 RX ADMIN — INSULIN LISPRO 2 UNITS: 100 INJECTION, SOLUTION INTRAVENOUS; SUBCUTANEOUS at 21:13

## 2023-11-25 RX ADMIN — 0.12% CHLORHEXIDINE GLUCONATE 15 ML: 1.2 RINSE ORAL at 21:09

## 2023-11-25 RX ADMIN — 0.12% CHLORHEXIDINE GLUCONATE 15 ML: 1.2 RINSE ORAL at 10:18

## 2023-11-25 RX ADMIN — SODIUM CHLORIDE 500 ML: 9 INJECTION, SOLUTION INTRAVENOUS at 21:08

## 2023-11-25 RX ADMIN — METOPROLOL TARTRATE 50 MG: 50 TABLET ORAL at 14:43

## 2023-11-25 RX ADMIN — METOPROLOL TARTRATE 50 MG: 50 TABLET ORAL at 11:34

## 2023-11-25 RX ADMIN — Medication 10 ML: at 10:18

## 2023-11-25 RX ADMIN — FENTANYL CITRATE 125 MCG/HR: 50 INJECTION, SOLUTION INTRAMUSCULAR; INTRAVENOUS at 00:10

## 2023-11-25 RX ADMIN — APIXABAN 5 MG: 5 TABLET, FILM COATED ORAL at 10:18

## 2023-11-25 RX ADMIN — DEXTROSE AND SODIUM CHLORIDE: 5; 900 INJECTION, SOLUTION INTRAVENOUS at 15:38

## 2023-11-25 RX ADMIN — PIPERACILLIN AND TAZOBACTAM 3375 MG: 3; .375 INJECTION, POWDER, LYOPHILIZED, FOR SOLUTION INTRAVENOUS at 06:25

## 2023-11-25 RX ADMIN — IPRATROPIUM BROMIDE AND ALBUTEROL SULFATE 1 DOSE: 2.5; .5 SOLUTION RESPIRATORY (INHALATION) at 10:05

## 2023-11-25 RX ADMIN — SODIUM CHLORIDE, PRESERVATIVE FREE 20 MG: 5 INJECTION INTRAVENOUS at 10:18

## 2023-11-25 RX ADMIN — PIPERACILLIN AND TAZOBACTAM 3375 MG: 3; .375 INJECTION, POWDER, LYOPHILIZED, FOR SOLUTION INTRAVENOUS at 14:45

## 2023-11-25 RX ADMIN — APIXABAN 5 MG: 5 TABLET, FILM COATED ORAL at 21:08

## 2023-11-25 ASSESSMENT — PAIN SCALES - GENERAL
PAINLEVEL_OUTOF10: 0

## 2023-11-25 ASSESSMENT — PULMONARY FUNCTION TESTS
PIF_VALUE: 17
PIF_VALUE: 43
PIF_VALUE: 18
PIF_VALUE: 19
PIF_VALUE: 18
PIF_VALUE: 18
PIF_VALUE: 17
PIF_VALUE: 20
PIF_VALUE: 18
PIF_VALUE: 18
PIF_VALUE: 23
PIF_VALUE: 18
PIF_VALUE: 19
PIF_VALUE: 19
PIF_VALUE: 17
PIF_VALUE: 18
PIF_VALUE: 17
PIF_VALUE: 17
PIF_VALUE: 19
PIF_VALUE: 19
PIF_VALUE: 17
PIF_VALUE: 18
PIF_VALUE: 20
PIF_VALUE: 18
PIF_VALUE: 17
PIF_VALUE: 17
PIF_VALUE: 19
PIF_VALUE: 24
PIF_VALUE: 18

## 2023-11-25 NOTE — PLAN OF CARE
Problem: Discharge Planning  Goal: Discharge to home or other facility with appropriate resources  Outcome: Progressing  Flowsheets (Taken 11/24/2023 1600 by Akanksha Quiñones RN)  Discharge to home or other facility with appropriate resources:   Identify barriers to discharge with patient and caregiver   Arrange for needed discharge resources and transportation as appropriate     Problem: Safety - Medical Restraint  Goal: Remains free of injury from restraints (Restraint for Interference with Medical Device)  Description: INTERVENTIONS:  1. Determine that other, less restrictive measures have been tried or would not be effective before applying the restraint  2. Evaluate the patient's condition at the time of restraint application  3. Inform patient/family regarding the reason for restraint  4. Q2H: Monitor safety, psychosocial status, comfort, nutrition and hydration  Outcome: Progressing  Flowsheets (Taken 11/24/2023 1737 by Akanksha Quiñones RN)  Remains free of injury from restraints (restraint for interference with medical device):   Determine that other, less restrictive measures have been tried or would not be effective before applying the restraint   Evaluate the patient's condition at the time of restraint application   Inform patient/family regarding the reason for restraint   Every 2 hours: Monitor safety, psychosocial status, comfort, nutrition and hydration     Problem: Pain  Goal: Verbalizes/displays adequate comfort level or baseline comfort level  Outcome: Progressing  Flowsheets (Taken 11/24/2023 2000)  Verbalizes/displays adequate comfort level or baseline comfort level:   Assess pain using appropriate pain scale   Administer analgesics based on type and severity of pain and evaluate response     Problem: Safety - Adult  Goal: Free from fall injury  Outcome: Progressing     Problem: Skin/Tissue Integrity  Goal: Absence of new skin breakdown  Description: 1.   Monitor for areas of redness and/or

## 2023-11-25 NOTE — CONSULTS
Inpatient consult to Cardiology  Consult performed by: Nikole Anders MD  Consult ordered by: Dorene Antunez MD          Patient Name: Jose Lewis Date: 2023 12:57 PM  MR #: [id number]  : 1940    Attending Physician: Dorene Antunez MD  Reason for consult: Afib    History of Presenting Illness:      Bruce Acuña is a 80 y.o. male on hospital day 1 with a history of atrial fibrillation on Eliquis for cardioembolic prophylaxis, CAD with remote PCI, hypertension, hyperlipidemia, MEEK, who was brought to the hospital after being found down. Currently in ICU intubated, cardiology consulted for management of A-fib  . History Obtained From:  patient, electronic medical record    Per electronic medical records  Patient was found bradycardic, patient was given atropine which switch patient into A-fib with RVR  Patient was hypoglycemic and hypoxic    Currently rates into the 80s  Patient intubated and sedated      History:      Past Medical History:   Diagnosis Date    CAD (coronary artery disease)     CHF (congestive heart failure) ([address])     Diabetes mellitus ([address])     Hyperlipidemia     Hypertension      Past Surgical History:   Procedure Laterality Date    COLONOSCOPY       Family History  No family history on file.   [] Unable to obtain due to ventilated and/ or neurologic status  Social History     Socioeconomic History    Marital status:      Spouse name: Not on file    Number of children: Not on file    Years of education: Not on file    Highest education level: Not on file   Occupational History    Not on file   Tobacco Use    Smoking status: Former    Smokeless tobacco: Never   Vaping Use    Vaping Use: Never used   Substance and Sexual Activity    Alcohol use: No    Drug use: No    Sexual activity: Not on file   Other Topics Concern    Not on file   Social History Narrative    Not on file     Social Determinants of Health     Financial Resource Strain: Not on file   Food Insecurity: chloride flush  10 mL IntraVENous 2 times per day    chlorhexidine  15 mL Mouth/Throat BID    famotidine (PEPCID) injection  20 mg IntraVENous Daily    ipratropium 0.5 mg-albuterol 2.5 mg  1 Dose Inhalation Q4H WA RT    insulin lispro  0-8 Units SubCUTAneous Q4H    apixaban  5 mg Oral BID     Continuous Infusions:   dilTIAZem 125 mg in sodium chloride 0.9 % 125 mL infusion Stopped (11/25/23 0043)    propofol 15 mcg/kg/min (11/25/23 0648)    fentaNYL 75 mcg/hr (11/25/23 0648)    sodium chloride      dextrose      dextrose 5 % and 0.9 % NaCl 100 mL/hr at 11/25/23 0648     PRN Meds:.sodium chloride flush, sodium chloride, ondansetron **OR** ondansetron, polyethylene glycol, acetaminophen **OR** acetaminophen, glucose, dextrose bolus **OR** dextrose bolus, glucagon (rDNA), dextrose   dilTIAZem 125 mg in sodium chloride 0.9 % 125 mL infusion Stopped (11/25/23 0043)    propofol 15 mcg/kg/min (11/25/23 0648)    fentaNYL 75 mcg/hr (11/25/23 0648)    sodium chloride      dextrose      dextrose 5 % and 0.9 % NaCl 100 mL/hr at 11/25/23 0648      Allergies: Allergies   Allergen Reactions    Lisinopril       Review of Systems:       Unable to obtain due to clinical condition    Objective Findings:     Vitals:   Vitals:    11/25/23 0530 11/25/23 0600 11/25/23 0630 11/25/23 0638   BP: (!) 95/53 (!) 94/58 (!) 96/50    Pulse: 69 84 82    Resp: 26 26 26    Temp:       TempSrc:       SpO2: 100% 100%     Weight:    88.1 kg (194 lb 3.6 oz)   Height:            Physical Examination:  General: Alert oriented x4 no acute distress  HEENT: Normocephalic, No scleral icterus. Neck: No JVD. Heart: Regular, no murmur, no rub/gallop. No RV heave. Lungs: Clear to ascultation, no rales/wheezing/rhonchi. Good chest wall excursion. Abdomen: Soft non tender non distended   extremities: No clubbing/cyanosis, no edema. Skin: Warm, dry, normal turgor, no rash, no bruise, no petichiae. Neuro: No myoclonus or tremor.    Psych: Normal

## 2023-11-25 NOTE — PROGRESS NOTES
19:00-07:30 Shift summary   Pt had an uneventful night. Assessments completed (see flowsheets). Pt remains on vent, tolerating well. IV medications titrated/infusing per orders (see emar). OG remains in place to low intermittent suction. Placement verified via respiratory status, external length of 55cm, and gastric content. Bed bath and complete linen change provided. Lab at bedside to draw labs. No s/s of distress noted throughout shift. Bedside handoff report given to oncoming RN. Safety measures in place.

## 2023-11-25 NOTE — CONSULTS
Subjective:      Patient ID: Nancy Lopez is a 80 y.o. male who presents today for encephalopathy. Severino Garcia HPI 66-year-old right-handed gentleman found down unresponsive on the ground. The exact timing of this is very unclear though he was noted to be quite hypoglycemic with a blood sugar of 37. Dextrose was given but his mentation did not improve. Patient was intubated and is on fentanyl and ketamine. We discontinued his sedation and he is quite awake. He has history of atrial fibrillation on Eliquis as well. MRI was reviewed which was done stat does not show any abnormal finding at least on diffusion-weighted images. Patient Sequent white matter disease.     Review of Systems   Unable to perform ROS: Intubated       Past Medical History:   Diagnosis Date    CAD (coronary artery disease)     CHF (congestive heart failure) (HCC)     Diabetes mellitus ([address])     Hyperlipidemia     Hypertension      Past Surgical History:   Procedure Laterality Date    COLONOSCOPY       Social History     Socioeconomic History    Marital status:      Spouse name: Not on file    Number of children: Not on file    Years of education: Not on file    Highest education level: Not on file   Occupational History    Not on file   Tobacco Use    Smoking status: Former    Smokeless tobacco: Never   Vaping Use    Vaping Use: Never used   Substance and Sexual Activity    Alcohol use: No    Drug use: No    Sexual activity: Not on file   Other Topics Concern    Not on file   Social History Narrative    Not on file     Social Determinants of Health     Financial Resource Strain: Not on file   Food Insecurity: No Food Insecurity (11/25/2023)    Hunger Vital Sign     Worried About Running Out of Food in the Last Year: Never true     Ran Out of Food in the Last Year: Never true   Transportation Needs: No Transportation Needs (11/25/2023)    Transportation Problems ([facility])     In the past 12 months, has lack of reliable transportation kept lower lobe retrocardiac space is within normal limits. No pneumothorax is seen. Cardiomegaly. No evidence for pneumothorax. Other findings as above. Lab Results   Component Value Date/Time    WBC 8.5 11/25/2023 03:01 AM    RBC 4.36 11/25/2023 03:01 AM    HGB 12.9 11/25/2023 09:30 AM    HCT 40.7 11/25/2023 03:01 AM    MCV 93.3 11/25/2023 03:01 AM    MCH 29.8 11/25/2023 03:01 AM    MCHC 31.9 11/25/2023 03:01 AM    RDW 15.5 11/25/2023 03:01 AM     11/25/2023 03:01 AM     Lab Results   Component Value Date/Time     11/25/2023 03:01 AM    K 4.5 11/25/2023 03:01 AM     11/25/2023 03:01 AM    CO2 19 11/25/2023 03:01 AM    BUN 22 11/25/2023 03:01 AM    CREATININE 0.7 11/25/2023 09:30 AM    CREATININE 0.76 11/25/2023 03:01 AM    GFRAA >60.0 08/17/2018 01:13 PM    LABGLOM >60 11/25/2023 09:30 AM    GLUCOSE 142 11/25/2023 03:01 AM    PROT 7.8 11/24/2023 01:15 PM    LABALBU 3.4 11/24/2023 01:15 PM    CALCIUM 9.1 11/25/2023 03:01 AM    BILITOT 1.2 11/24/2023 01:15 PM    ALKPHOS 137 11/24/2023 01:15 PM    AST 68 11/24/2023 01:15 PM    ALT 23 11/24/2023 01:15 PM     Lab Results   Component Value Date/Time    PROTIME 16.7 11/24/2023 01:15 PM    INR 1.3 11/24/2023 01:15 PM     No results found for: \"TSH\", \"VHWENTJQ62\", \"FOLATE\", \"FERRITIN\", \"IRON\", \"TIBC\", \"PTRFSAT\", \"RETICCOUNT\", \"FREET4\"  No results found for: \"TRIG\", \"HDL\", \"LDLCALC\", \"LDLDIRECT\", \"LABVLDL\"  Lab Results   Component Value Date/Time    LABAMPH Neg 11/24/2023 01:15 PM    BARBSCNU Neg 11/24/2023 01:15 PM    LABBENZ Neg 11/24/2023 01:15 PM    LABMETH Neg 11/24/2023 01:15 PM    OPIATESCREENURINE Neg 11/24/2023 01:15 PM    PHENCYCLIDINESCREENURINE Neg 11/24/2023 01:15 PM     No results found for: \"LITHIUM\", \"DILFRTOT\", \"VALPROATE\"    Assessment:   Metabolic encephalopathy secondary to prolonged hypoglycemia. The timing of this is very difficult to ascertain as this could have been several hours before found.   An underlying seizure cannot

## 2023-11-26 PROBLEM — Z79.4 TYPE 2 DIABETES MELLITUS WITH HYPOGLYCEMIA AND COMA, WITH LONG-TERM CURRENT USE OF INSULIN (HCC): Status: ACTIVE | Noted: 2023-11-26

## 2023-11-26 PROBLEM — E11.641 TYPE 2 DIABETES MELLITUS WITH HYPOGLYCEMIA AND COMA, WITH LONG-TERM CURRENT USE OF INSULIN (HCC): Status: ACTIVE | Noted: 2023-11-26

## 2023-11-26 LAB
BACTERIA UR CULT: NORMAL
BASE EXCESS ARTERIAL: -2 (ref -3–3)
CALCIUM IONIZED: 1.33 MMOL/L (ref 1.12–1.32)
GLUCOSE BLD-MCNC: 152 MG/DL (ref 70–99)
GLUCOSE BLD-MCNC: 159 MG/DL (ref 70–99)
GLUCOSE BLD-MCNC: 177 MG/DL (ref 70–99)
GLUCOSE BLD-MCNC: 197 MG/DL (ref 70–99)
GLUCOSE BLD-MCNC: 214 MG/DL (ref 70–99)
GLUCOSE BLD-MCNC: 214 MG/DL (ref 70–99)
HCO3 ARTERIAL: 23.7 MMOL/L (ref 21–29)
HCT VFR BLD AUTO: 36 % (ref 41–53)
HGB BLD CALC-MCNC: 12.1 GM/DL (ref 13.5–17.5)
LACTATE: 1.35 MMOL/L (ref 0.4–2)
O2 SAT, ARTERIAL: 99 % (ref 93–100)
PCO2 ARTERIAL: 42 MM HG (ref 35–45)
PERFORMED ON: ABNORMAL
PH ARTERIAL: 7.36 (ref 7.35–7.45)
PO2 ARTERIAL: 148 MM HG (ref 75–108)
POC CHLORIDE: 112 MEQ/L (ref 99–110)
POC CREATININE: 0.6 MG/DL (ref 0.8–1.3)
POC FIO2: 40
POC SAMPLE TYPE: ABNORMAL
POTASSIUM SERPL-SCNC: 3.2 MEQ/L (ref 3.5–5.1)
SODIUM BLD-SCNC: 145 MEQ/L (ref 136–145)
TCO2 ARTERIAL: 25 MMOL/L (ref 21–32)

## 2023-11-26 PROCEDURE — 2500000003 HC RX 250 WO HCPCS: Performed by: INTERNAL MEDICINE

## 2023-11-26 PROCEDURE — 82435 ASSAY OF BLOOD CHLORIDE: CPT

## 2023-11-26 PROCEDURE — 6360000002 HC RX W HCPCS: Performed by: INTERNAL MEDICINE

## 2023-11-26 PROCEDURE — 82330 ASSAY OF CALCIUM: CPT

## 2023-11-26 PROCEDURE — 99232 SBSQ HOSP IP/OBS MODERATE 35: CPT | Performed by: INTERNAL MEDICINE

## 2023-11-26 PROCEDURE — 84295 ASSAY OF SERUM SODIUM: CPT

## 2023-11-26 PROCEDURE — 82565 ASSAY OF CREATININE: CPT

## 2023-11-26 PROCEDURE — 94640 AIRWAY INHALATION TREATMENT: CPT

## 2023-11-26 PROCEDURE — 82948 REAGENT STRIP/BLOOD GLUCOSE: CPT

## 2023-11-26 PROCEDURE — 83605 ASSAY OF LACTIC ACID: CPT

## 2023-11-26 PROCEDURE — 2580000003 HC RX 258: Performed by: INTERNAL MEDICINE

## 2023-11-26 PROCEDURE — 2000000000 HC ICU R&B

## 2023-11-26 PROCEDURE — A4216 STERILE WATER/SALINE, 10 ML: HCPCS | Performed by: INTERNAL MEDICINE

## 2023-11-26 PROCEDURE — 94660 CPAP INITIATION&MGMT: CPT

## 2023-11-26 PROCEDURE — 6370000000 HC RX 637 (ALT 250 FOR IP): Performed by: INTERNAL MEDICINE

## 2023-11-26 PROCEDURE — P9047 ALBUMIN (HUMAN), 25%, 50ML: HCPCS | Performed by: INTERNAL MEDICINE

## 2023-11-26 PROCEDURE — 94003 VENT MGMT INPAT SUBQ DAY: CPT

## 2023-11-26 PROCEDURE — 99291 CRITICAL CARE FIRST HOUR: CPT | Performed by: INTERNAL MEDICINE

## 2023-11-26 PROCEDURE — 82803 BLOOD GASES ANY COMBINATION: CPT

## 2023-11-26 PROCEDURE — 2700000000 HC OXYGEN THERAPY PER DAY

## 2023-11-26 PROCEDURE — 94761 N-INVAS EAR/PLS OXIMETRY MLT: CPT

## 2023-11-26 PROCEDURE — 85014 HEMATOCRIT: CPT

## 2023-11-26 PROCEDURE — 99223 1ST HOSP IP/OBS HIGH 75: CPT | Performed by: PHYSICIAN ASSISTANT

## 2023-11-26 PROCEDURE — 2580000003 HC RX 258: Performed by: EMERGENCY MEDICINE

## 2023-11-26 PROCEDURE — 2500000003 HC RX 250 WO HCPCS: Performed by: EMERGENCY MEDICINE

## 2023-11-26 PROCEDURE — 84132 ASSAY OF SERUM POTASSIUM: CPT

## 2023-11-26 PROCEDURE — 99232 SBSQ HOSP IP/OBS MODERATE 35: CPT | Performed by: PSYCHIATRY & NEUROLOGY

## 2023-11-26 PROCEDURE — 36600 WITHDRAWAL OF ARTERIAL BLOOD: CPT

## 2023-11-26 RX ORDER — POLYETHYLENE GLYCOL 3350 17 G/17G
17 POWDER, FOR SOLUTION ORAL DAILY
Status: DISCONTINUED | OUTPATIENT
Start: 2023-11-26 | End: 2023-12-01 | Stop reason: HOSPADM

## 2023-11-26 RX ORDER — MIDODRINE HYDROCHLORIDE 2.5 MG/1
5 TABLET ORAL
Status: DISCONTINUED | OUTPATIENT
Start: 2023-11-26 | End: 2023-11-28

## 2023-11-26 RX ADMIN — IPRATROPIUM BROMIDE AND ALBUTEROL SULFATE 1 DOSE: 2.5; .5 SOLUTION RESPIRATORY (INHALATION) at 09:27

## 2023-11-26 RX ADMIN — DILTIAZEM HYDROCHLORIDE 12.5 MG/HR: 5 INJECTION, SOLUTION INTRAVENOUS at 21:13

## 2023-11-26 RX ADMIN — IPRATROPIUM BROMIDE AND ALBUTEROL SULFATE 1 DOSE: 2.5; .5 SOLUTION RESPIRATORY (INHALATION) at 16:04

## 2023-11-26 RX ADMIN — DEXTROSE AND SODIUM CHLORIDE: 5; 900 INJECTION, SOLUTION INTRAVENOUS at 11:30

## 2023-11-26 RX ADMIN — PIPERACILLIN AND TAZOBACTAM 3375 MG: 3; .375 INJECTION, POWDER, LYOPHILIZED, FOR SOLUTION INTRAVENOUS at 06:15

## 2023-11-26 RX ADMIN — DOCUSATE SODIUM 100 MG: 50 LIQUID ORAL at 10:40

## 2023-11-26 RX ADMIN — IPRATROPIUM BROMIDE AND ALBUTEROL SULFATE 1 DOSE: 2.5; .5 SOLUTION RESPIRATORY (INHALATION) at 20:19

## 2023-11-26 RX ADMIN — 0.12% CHLORHEXIDINE GLUCONATE 15 ML: 1.2 RINSE ORAL at 08:09

## 2023-11-26 RX ADMIN — Medication 5 MCG/MIN: at 00:21

## 2023-11-26 RX ADMIN — MIDODRINE HYDROCHLORIDE 5 MG: 5 TABLET ORAL at 17:08

## 2023-11-26 RX ADMIN — DEXTROSE AND SODIUM CHLORIDE: 5; 900 INJECTION, SOLUTION INTRAVENOUS at 20:31

## 2023-11-26 RX ADMIN — ALBUMIN (HUMAN) 25 G: 0.25 INJECTION, SOLUTION INTRAVENOUS at 00:25

## 2023-11-26 RX ADMIN — SODIUM CHLORIDE, PRESERVATIVE FREE 20 MG: 5 INJECTION INTRAVENOUS at 08:09

## 2023-11-26 RX ADMIN — DEXTROSE AND SODIUM CHLORIDE: 5; 900 INJECTION, SOLUTION INTRAVENOUS at 01:23

## 2023-11-26 RX ADMIN — INSULIN LISPRO 2 UNITS: 100 INJECTION, SOLUTION INTRAVENOUS; SUBCUTANEOUS at 09:31

## 2023-11-26 RX ADMIN — APIXABAN 5 MG: 5 TABLET, FILM COATED ORAL at 08:09

## 2023-11-26 RX ADMIN — INSULIN LISPRO 2 UNITS: 100 INJECTION, SOLUTION INTRAVENOUS; SUBCUTANEOUS at 15:23

## 2023-11-26 RX ADMIN — POLYETHYLENE GLYCOL 3350 17 G: 17 POWDER, FOR SOLUTION ORAL at 10:40

## 2023-11-26 RX ADMIN — Medication 10 ML: at 20:37

## 2023-11-26 RX ADMIN — Medication 5 MCG/MIN: at 00:11

## 2023-11-26 RX ADMIN — APIXABAN 5 MG: 5 TABLET, FILM COATED ORAL at 20:37

## 2023-11-26 RX ADMIN — DOCUSATE SODIUM 100 MG: 50 LIQUID ORAL at 20:37

## 2023-11-26 RX ADMIN — Medication 10 ML: at 08:09

## 2023-11-26 RX ADMIN — IPRATROPIUM BROMIDE AND ALBUTEROL SULFATE 1 DOSE: 2.5; .5 SOLUTION RESPIRATORY (INHALATION) at 04:53

## 2023-11-26 RX ADMIN — DILTIAZEM HYDROCHLORIDE 5 MG/HR: 5 INJECTION, SOLUTION INTRAVENOUS at 13:46

## 2023-11-26 RX ADMIN — MIDODRINE HYDROCHLORIDE 5 MG: 5 TABLET ORAL at 11:30

## 2023-11-26 ASSESSMENT — PAIN SCALES - GENERAL
PAINLEVEL_OUTOF10: 0

## 2023-11-26 ASSESSMENT — PULMONARY FUNCTION TESTS
PIF_VALUE: 19
PIF_VALUE: 38
PIF_VALUE: 11
PIF_VALUE: 19
PIF_VALUE: 20
PIF_VALUE: 20
PIF_VALUE: 18
PIF_VALUE: 19
PIF_VALUE: 20
PIF_VALUE: 18
PIF_VALUE: 20
PIF_VALUE: 21
PIF_VALUE: 18
PIF_VALUE: 19
PIF_VALUE: 18
PIF_VALUE: 19
PIF_VALUE: 19
PIF_VALUE: 20
PIF_VALUE: 19
PIF_VALUE: 19

## 2023-11-26 NOTE — PROGRESS NOTES
Cardiology progress note    Patient Name: Linette Singh Date: 2023 12:57 PM  MR #: [id number]  : 1940    Attending Physician: Garima Deng MD  Reason for consult: Afib    History of Presenting Illness:      Melquiades Hahn is a 80 y.o. male on hospital day 2 with a history of atrial fibrillation on Eliquis for cardioembolic prophylaxis, CAD with remote PCI, hypertension, hyperlipidemia, MEEK, who was brought to the hospital after being found down. Currently in ICU intubated, cardiology consulted for management of A-fib  . History Obtained From:  patient, electronic medical record    Per electronic medical records  Patient was found bradycardic, patient was given atropine which switch patient into A-fib with RVR  Patient was hypoglycemic and hypoxic    Currently rates into the 80s  Patient intubated and sedated  =============  Hospital course  2023  Patient still intubated and sedated  Not on pressors  Diltiazem drip turned off  Currently A-fib well-controlled 80s to 90s      History:      Past Medical History:   Diagnosis Date    CAD (coronary artery disease)     CHF (congestive heart failure) ([address])     Diabetes mellitus ([address])     Hyperlipidemia     Hypertension      Past Surgical History:   Procedure Laterality Date    COLONOSCOPY       Family History  No family history on file.   [] Unable to obtain due to ventilated and/ or neurologic status  Social History     Socioeconomic History    Marital status:      Spouse name: Not on file    Number of children: Not on file    Years of education: Not on file    Highest education level: Not on file   Occupational History    Not on file   Tobacco Use    Smoking status: Former    Smokeless tobacco: Never   Vaping Use    Vaping Use: Never used   Substance and Sexual Activity    Alcohol use: No    Drug use: No    Sexual activity: Not on file   Other Topics Concern    Not on file   Social History Narrative    Not on file     Social Determinants

## 2023-11-26 NOTE — CARE COORDINATION
LSW tried to meet with pt at bedside. Pt currently on vent. No family at bedside. LSW tried to call pt's DTR. No answer. Reviewing ER CM's notes. APS was notify on admission due to home living conditions. CMI/ACP assessment still need. LSW will continue to follow.      Electronically signed by MAULIK العراقي on 11/26/2023 at 9:08 AM

## 2023-11-26 NOTE — PROGRESS NOTES
endotracheal tube and orogastric tube are noted. Prominent lymph nodes are noted at the base of the neck on axial image 52 measuring 1.6 by 1.2 cm. The aorta demonstrates atheromatous plaque the origin of the 4 great vessels. A direct origin of the left vertebral artery proximal to the left subclavian artery is noted. Atheromatous plaque is noted within the carotid bifurcations, bilaterally. No acute abnormality of the cervical spine. Prominent lymph node at the base of the neck on the right measures 1.6 x 1.2 cm. CT Head W/O Contrast    Result Date: 11/24/2023  EXAMINATION: CT OF THE HEAD WITHOUT CONTRAST  11/24/2023 2:05 pm TECHNIQUE: CT of the head was performed without the administration of intravenous contrast. Automated exposure control, iterative reconstruction, and/or weight based adjustment of the mA/kV was utilized to reduce the radiation dose to as low as reasonably achievable. COMPARISON: October 2022 HISTORY: ORDERING SYSTEM PROVIDED HISTORY: ams TECHNOLOGIST PROVIDED HISTORY: Reason for exam:->ams Has a \"code stroke\" or \"stroke alert\" been called? ->No Decision Support Exception - unselect if not a suspected or confirmed emergency medical condition->Emergency Medical Condition (MA) What reading provider will be dictating this exam?->CRC FINDINGS: BRAIN/VENTRICLES: There is no acute intracranial hemorrhage, mass effect or midline shift. No abnormal extra-axial fluid collection. The gray-white differentiation is maintained without evidence of an acute infarct. There is prominence of the ventricles and sulci due to global parenchymal volume loss. There are nonspecific areas of hypoattenuation within the periventricular and subcortical white matter, which likely represent chronic microvascular ischemic change. ORBITS: The visualized portion of the orbits demonstrate no acute abnormality. SINUSES: Air-fluid levels are seen in the bilateral maxillary sinuses, right greater than left.   There is advanced mucosal thickening of the bilateral ethmoid sinuses. SOFT TISSUES/SKULL: No acute abnormality of the visualized skull or soft tissues. No acute intracranial abnormality. Moderate chronic senescent change Somewhat advanced changes of acute sinusitis     XR CHEST PORTABLE    Result Date: 11/24/2023  EXAMINATION: ONE XRAY VIEW OF THE CHEST 11/24/2023 1:40 pm COMPARISON: 10/19/2022 AP portable chest. HISTORY: ORDERING SYSTEM PROVIDED HISTORY: ams TECHNOLOGIST PROVIDED HISTORY: Reason for exam:->ams What reading provider will be dictating this exam?->CRC FINDINGS: An orogastric tube courses into the stomach. An endotracheal tube is noted approximately 2.5 cm above the marry. The heart is enlarged. An unfolded appearance of the aorta is noted. The left lower lobe retrocardiac space is within normal limits. No pneumothorax is seen. Cardiomegaly. No evidence for pneumothorax. Other findings as above. Recent Labs     11/24/23  1315 11/25/23  0301 11/25/23  0930 11/25/23  1401   WBC 9.1 8.5  --   --    HGB 13.2* 13.0* 12.9* 13.8    143  --   --      Recent Labs     11/24/23  1315 11/25/23  0301 11/25/23  0930 11/25/23  1401    139  --   --    K 4.2 4.5  --   --     106  --   --    CO2 19* 19*  --   --    BUN 22 22  --   --    CREATININE 0.82 0.76 0.7* 0.7*   GLUCOSE 192* 142*  --   --      Recent Labs     11/24/23  1315   BILITOT 1.2*   ALKPHOS 137*   AST 68*   ALT 23     Lab Results   Component Value Date/Time    PROTIME 16.7 11/24/2023 01:15 PM    INR 1.3 11/24/2023 01:15 PM     No results found for: \"LITHIUM\", \"DILFRTOT\", \"VALPROATE\"    ASSESSMENT AND PLAN  Encephalopathy with respiratory failure with hypoglycemic event. The timing of this is very unclear though patient an MRI yesterday which does not show anything significant and is quite awake and nonfocal and following commands. Patient may be extubated from neurological standpoint and then we will reexamine now.

## 2023-11-26 NOTE — CONSULTS
Endocrinology Consult      Lui Merchant  1940  [id number]    Date of Admission:  11/24/2023 12:57 PM  Date of Consultation:11/26/2023    Consultant:Jono Amos PA-C  Supervising Physician: Kyra Hutchins MD  PCP:  SANTANA Peñaloza - CNP       Reason for Consultation: Hypoglycemia    C/C:   Chief Complaint   Patient presents with    Altered Mental Status     Pt found unresponsive       Assessment-  Type 2 insulin-dependent diabetes  Noncompliance with glucose monitoring at home per PCP notes  Respiratory distress, intubated  Acute metabolic encephalopathy    Plan-  Humalog medium dose sliding scale coverage every 4 hours  POCT glucose checks every 4 hours  Monitor glycemic control closely avoid hypoglycemia  Tube feedings ordered and started  Inpatient diabetic education ordered  We will discuss continuous glucose monitoring systems with the patient at his initial outpatient appointment      Altered Mental Status      History of Present Illness: This patient is an 66-year-old type 2 insulin-dependent diabetic male currently intubated in the intensive care unit. He was found unresponsive at home by his family, they stated could have been up to 12 hours that he was unconscious and on the floor. He was severely hypoglycemic in the ED, given IV dextrose with improvement in his glucose levels. He was hypoxic and in respiratory failure intubated and admitted to the intensive care unit. OG tube is in place, tube feedings have been started. I reviewed previous laboratory studies in care everywhere, his PCP is with [facility]. He had a hemoglobin A1c 3/2023 of 12.6%. He was started on Lantus, continued on pioglitazone and metformin, with improvement in his glycemic control. Hemoglobin A1c 7/2023 improved to 9.7%, most recent A1c 11/2023 is 5.2%. According to his PCP the patient does not check his glucose levels at home despite recommendations by her especially since he is on insulin.   In one of his outpatient notes there is indication that he was also taken Novolin 70/30 however I cannot find that on any of his medications list.  We will do medication reconciliation hopefully with one of his family members to identify exactly what his diabetic medication regiment was and we will make appropriate adjustments upon discharge. I will discuss continuous glucose monitoring system with the patient and can try to order that when he is discharged, will likely need to wait until he sees me for follow-up as an outpatient to get this approved. Allergies: Allergies   Allergen Reactions    Lisinopril        PMH: Patient has a past medical history of CAD (coronary artery disease), CHF (congestive heart failure) ([address]), Diabetes mellitus ([address]), Hyperlipidemia, and Hypertension. PSH: Patient has a past surgical history that includes Colonoscopy. Social History: Patient reports that he has quit smoking. He has never used smokeless tobacco. He reports that he does not drink alcohol and does not use drugs. Family History: Patientsfamily history is not on file.     ROS:  Review of Systems   Unable to perform ROS: Intubated       Current Meds: docusate (COLACE) 50 MG/5ML liquid 100 mg, BID  polyethylene glycol (GLYCOLAX) packet 17 g, Daily  midodrine (PROAMATINE) tablet 5 mg, TID WC  [Held by provider] metoprolol tartrate (LOPRESSOR) tablet 50 mg, TID  dilTIAZem 125 mg in sodium chloride 0.9 % 125 mL infusion, Continuous  propofol infusion, Continuous  fentaNYL (SUBLIMAZE) 1,000 mcg in sodium chloride 0.9 % 100 mL infusion, Continuous  sodium chloride flush 0.9 % injection 10 mL, 2 times per day  sodium chloride flush 0.9 % injection 10 mL, PRN  0.9 % sodium chloride infusion, PRN  ondansetron (ZOFRAN-ODT) disintegrating tablet 4 mg, Q8H PRN   Or  ondansetron (ZOFRAN) injection 4 mg, Q6H PRN  acetaminophen (TYLENOL) tablet 650 mg, Q6H PRN   Or  acetaminophen (TYLENOL) suppository 650 mg, Q6H PRN  chlorhexidine (PERIDEX) 0.12 %

## 2023-11-26 NOTE — PROGRESS NOTES
Patient ID:    Lisette Rebolledo  [id number]  80 y.o.  1940    1900 Assumed Care of Patient. Report Received from [facility]. 1930 Assessment Complete, please see flow sheets. Labs, orders, plan of care IV fluids, ventilator settings and meds reviewed. OG verified for placement. Restraints checked for safety. Blank draining to gravity. 2030 noted low b/p. Secure message sent to Dr. Ivonne Schmitt. New orders received and implemented. [phone] patient reassessed. See EMR for details. Patient also noted for low b/p. Dr. Ivonne Schmitt notified via secure message. New orders received.  0300 patient received bath and total line change. Patient tolerated well.  0358 patient reassessed. No changes from previous assessments.         Electronically signed by Mariza Dobson RN

## 2023-11-27 ENCOUNTER — APPOINTMENT (OUTPATIENT)
Dept: GENERAL RADIOLOGY | Age: 83
End: 2023-11-27
Payer: MEDICARE

## 2023-11-27 LAB
ALBUMIN SERPL-MCNC: 3 G/DL (ref 3.5–4.6)
ANION GAP SERPL CALCULATED.3IONS-SCNC: 10 MEQ/L (ref 9–15)
BASOPHILS # BLD: 0 K/UL (ref 0–0.2)
BASOPHILS NFR BLD: 0.3 %
BUN SERPL-MCNC: 12 MG/DL (ref 8–23)
CALCIUM SERPL-MCNC: 8.8 MG/DL (ref 8.5–9.9)
CHLORIDE SERPL-SCNC: 108 MEQ/L (ref 95–107)
CO2 SERPL-SCNC: 23 MEQ/L (ref 20–31)
CREAT SERPL-MCNC: 0.63 MG/DL (ref 0.7–1.2)
EOSINOPHIL # BLD: 0.1 K/UL (ref 0–0.7)
EOSINOPHIL NFR BLD: 0.7 %
ERYTHROCYTE [DISTWIDTH] IN BLOOD BY AUTOMATED COUNT: 15.9 % (ref 11.5–14.5)
GLUCOSE BLD-MCNC: 134 MG/DL (ref 70–99)
GLUCOSE BLD-MCNC: 188 MG/DL (ref 70–99)
GLUCOSE BLD-MCNC: 191 MG/DL (ref 70–99)
GLUCOSE BLD-MCNC: 205 MG/DL (ref 70–99)
GLUCOSE BLD-MCNC: 207 MG/DL (ref 70–99)
GLUCOSE BLD-MCNC: 212 MG/DL (ref 70–99)
GLUCOSE SERPL-MCNC: 221 MG/DL (ref 70–99)
HCT VFR BLD AUTO: 36.9 % (ref 42–52)
HGB BLD-MCNC: 11.6 G/DL (ref 14–18)
LYMPHOCYTES # BLD: 1 K/UL (ref 1–4.8)
LYMPHOCYTES NFR BLD: 14.1 %
MCH RBC QN AUTO: 29.7 PG (ref 27–31.3)
MCHC RBC AUTO-ENTMCNC: 31.4 % (ref 33–37)
MCV RBC AUTO: 94.6 FL (ref 79–92.2)
MONOCYTES # BLD: 0.8 K/UL (ref 0.2–0.8)
MONOCYTES NFR BLD: 11 %
NEUTROPHILS # BLD: 5 K/UL (ref 1.4–6.5)
NEUTS SEG NFR BLD: 73.3 %
PERFORMED ON: ABNORMAL
PHOSPHATE SERPL-MCNC: 1.8 MG/DL (ref 2.3–4.8)
PLATELET # BLD AUTO: 178 K/UL (ref 130–400)
POTASSIUM SERPL-SCNC: 3.3 MEQ/L (ref 3.4–4.9)
RBC # BLD AUTO: 3.9 M/UL (ref 4.7–6.1)
SODIUM SERPL-SCNC: 141 MEQ/L (ref 135–144)
WBC # BLD AUTO: 6.8 K/UL (ref 4.8–10.8)

## 2023-11-27 PROCEDURE — 2580000003 HC RX 258: Performed by: INTERNAL MEDICINE

## 2023-11-27 PROCEDURE — 6370000000 HC RX 637 (ALT 250 FOR IP): Performed by: INTERNAL MEDICINE

## 2023-11-27 PROCEDURE — 99213 OFFICE O/P EST LOW 20 MIN: CPT

## 2023-11-27 PROCEDURE — 92611 MOTION FLUOROSCOPY/SWALLOW: CPT

## 2023-11-27 PROCEDURE — 99233 SBSQ HOSP IP/OBS HIGH 50: CPT | Performed by: INTERNAL MEDICINE

## 2023-11-27 PROCEDURE — 80069 RENAL FUNCTION PANEL: CPT

## 2023-11-27 PROCEDURE — A4216 STERILE WATER/SALINE, 10 ML: HCPCS | Performed by: INTERNAL MEDICINE

## 2023-11-27 PROCEDURE — 85025 COMPLETE CBC W/AUTO DIFF WBC: CPT

## 2023-11-27 PROCEDURE — 99233 SBSQ HOSP IP/OBS HIGH 50: CPT | Performed by: PSYCHIATRY & NEUROLOGY

## 2023-11-27 PROCEDURE — 2500000003 HC RX 250 WO HCPCS: Performed by: NURSE PRACTITIONER

## 2023-11-27 PROCEDURE — 2700000000 HC OXYGEN THERAPY PER DAY

## 2023-11-27 PROCEDURE — 99232 SBSQ HOSP IP/OBS MODERATE 35: CPT | Performed by: PHYSICIAN ASSISTANT

## 2023-11-27 PROCEDURE — 94640 AIRWAY INHALATION TREATMENT: CPT

## 2023-11-27 PROCEDURE — 6370000000 HC RX 637 (ALT 250 FOR IP): Performed by: PHYSICIAN ASSISTANT

## 2023-11-27 PROCEDURE — 2500000003 HC RX 250 WO HCPCS: Performed by: INTERNAL MEDICINE

## 2023-11-27 PROCEDURE — 94761 N-INVAS EAR/PLS OXIMETRY MLT: CPT

## 2023-11-27 PROCEDURE — 92523 SPEECH SOUND LANG COMPREHEN: CPT

## 2023-11-27 PROCEDURE — 74230 X-RAY XM SWLNG FUNCJ C+: CPT

## 2023-11-27 PROCEDURE — 2580000003 HC RX 258: Performed by: NURSE PRACTITIONER

## 2023-11-27 PROCEDURE — 36415 COLL VENOUS BLD VENIPUNCTURE: CPT

## 2023-11-27 PROCEDURE — 2060000000 HC ICU INTERMEDIATE R&B

## 2023-11-27 PROCEDURE — 92610 EVALUATE SWALLOWING FUNCTION: CPT

## 2023-11-27 RX ORDER — IPRATROPIUM BROMIDE AND ALBUTEROL SULFATE 2.5; .5 MG/3ML; MG/3ML
1 SOLUTION RESPIRATORY (INHALATION) EVERY 4 HOURS PRN
Status: DISCONTINUED | OUTPATIENT
Start: 2023-11-27 | End: 2023-11-27

## 2023-11-27 RX ORDER — INSULIN GLARGINE 100 [IU]/ML
10 INJECTION, SOLUTION SUBCUTANEOUS EVERY MORNING
Status: DISCONTINUED | OUTPATIENT
Start: 2023-11-27 | End: 2023-11-30

## 2023-11-27 RX ORDER — SODIUM CHLORIDE 450 MG/100ML
INJECTION, SOLUTION INTRAVENOUS CONTINUOUS
Status: DISCONTINUED | OUTPATIENT
Start: 2023-11-27 | End: 2023-11-28

## 2023-11-27 RX ORDER — IPRATROPIUM BROMIDE AND ALBUTEROL SULFATE 2.5; .5 MG/3ML; MG/3ML
1 SOLUTION RESPIRATORY (INHALATION) 2 TIMES DAILY
Status: DISCONTINUED | OUTPATIENT
Start: 2023-11-27 | End: 2023-11-27

## 2023-11-27 RX ORDER — IPRATROPIUM BROMIDE AND ALBUTEROL SULFATE 2.5; .5 MG/3ML; MG/3ML
1 SOLUTION RESPIRATORY (INHALATION) EVERY 4 HOURS PRN
Status: DISCONTINUED | OUTPATIENT
Start: 2023-11-27 | End: 2023-11-29

## 2023-11-27 RX ORDER — INSULIN LISPRO 100 [IU]/ML
0-8 INJECTION, SOLUTION INTRAVENOUS; SUBCUTANEOUS
Status: DISCONTINUED | OUTPATIENT
Start: 2023-11-27 | End: 2023-12-01 | Stop reason: HOSPADM

## 2023-11-27 RX ADMIN — DEXTROSE AND SODIUM CHLORIDE: 5; 900 INJECTION, SOLUTION INTRAVENOUS at 06:21

## 2023-11-27 RX ADMIN — POTASSIUM PHOSPHATE, MONOBASIC POTASSIUM PHOSPHATE, DIBASIC 15 MMOL: 224; 236 INJECTION, SOLUTION, CONCENTRATE INTRAVENOUS at 10:56

## 2023-11-27 RX ADMIN — APIXABAN 5 MG: 5 TABLET, FILM COATED ORAL at 20:35

## 2023-11-27 RX ADMIN — METOPROLOL TARTRATE 12.5 MG: 25 TABLET, FILM COATED ORAL at 20:35

## 2023-11-27 RX ADMIN — SODIUM CHLORIDE, PRESERVATIVE FREE 20 MG: 5 INJECTION INTRAVENOUS at 08:00

## 2023-11-27 RX ADMIN — MIDODRINE HYDROCHLORIDE 5 MG: 5 TABLET ORAL at 17:05

## 2023-11-27 RX ADMIN — BARIUM SULFATE 50 ML: 0.81 POWDER, FOR SUSPENSION ORAL at 14:41

## 2023-11-27 RX ADMIN — SODIUM CHLORIDE: 4.5 INJECTION, SOLUTION INTRAVENOUS at 10:55

## 2023-11-27 RX ADMIN — INSULIN LISPRO 2 UNITS: 100 INJECTION, SOLUTION INTRAVENOUS; SUBCUTANEOUS at 06:01

## 2023-11-27 RX ADMIN — MIDODRINE HYDROCHLORIDE 5 MG: 5 TABLET ORAL at 08:01

## 2023-11-27 RX ADMIN — BARIUM SULFATE 80 ML: 400 SUSPENSION ORAL at 14:41

## 2023-11-27 RX ADMIN — IPRATROPIUM BROMIDE AND ALBUTEROL SULFATE 1 DOSE: 2.5; .5 SOLUTION RESPIRATORY (INHALATION) at 04:26

## 2023-11-27 RX ADMIN — INSULIN LISPRO 2 UNITS: 100 INJECTION, SOLUTION INTRAVENOUS; SUBCUTANEOUS at 08:12

## 2023-11-27 RX ADMIN — IPRATROPIUM BROMIDE AND ALBUTEROL SULFATE 1 DOSE: 2.5; .5 SOLUTION RESPIRATORY (INHALATION) at 10:37

## 2023-11-27 RX ADMIN — DOCUSATE SODIUM 100 MG: 50 LIQUID ORAL at 20:35

## 2023-11-27 RX ADMIN — INSULIN GLARGINE 10 UNITS: 100 INJECTION, SOLUTION SUBCUTANEOUS at 11:04

## 2023-11-27 RX ADMIN — APIXABAN 5 MG: 5 TABLET, FILM COATED ORAL at 08:01

## 2023-11-27 RX ADMIN — Medication 10 ML: at 08:02

## 2023-11-27 RX ADMIN — METOPROLOL TARTRATE 12.5 MG: 25 TABLET, FILM COATED ORAL at 10:56

## 2023-11-27 ASSESSMENT — PAIN SCALES - GENERAL
PAINLEVEL_OUTOF10: 0

## 2023-11-27 ASSESSMENT — PAIN SCALES - WONG BAKER: WONGBAKER_NUMERICALRESPONSE: 0

## 2023-11-27 NOTE — PROGRESS NOTES
11/27/23 1100   RT Protocol   History Pulmonary Disease 1   Respiratory pattern 2   Breath sounds 2   Cough 0   Indications for Bronchodilator Therapy None   Bronchodilator Assessment Score 5

## 2023-11-27 NOTE — PROGRESS NOTES
Patient ID:    Penny Romo  [id number]  80 y.o.  1940    1900 Assumed Care of Patient. Report Received from Viviana Escobar.  2000 Assessment Complete, please see flow sheets. Labs, orders, plan of care IV fluids, oxygen settings and meds reviewed. 3 oz swallow evaluation performed. Patient passed. No aspiration noted. 2130 patient's daughter called from [place]. HIPAA code requested and received. Update given. 0000 patient reassessed. See EMR for details. 0149  diltiazem titrated down to 10 mg/hr for desired effect. 0739 diltiazem titrated down to 7.5 mg/hr. 0418 diltiazem titrated down to 5 mg/hr.  0621 diltiazem titrated down to 2.5 mg/hr. 7462 patient appears more orientated throughout the night. He will frequently ask for water and when he will be going home. This RN educated the patient on the importance of staying at the hospital to get better so when he goes home he will be able to take care of his wife. Patient verbalized understanding.       Electronically signed by Chela Amato RN

## 2023-11-27 NOTE — PROGRESS NOTES
Wound Ostomy Continence Nurse  Consult Note       NAME:  [facility] RECORD NUMBER:  [id number]  AGE: 80 y.o. GENDER: male  : 1940  TODAY'S DATE:  2023    Subjective   Reason for [address] [facility] Nurse Evaluation and Assessment: Pressure injuries back and sacrum      Julien Holt is a 80 y.o. male referred by:   [x] Physician  [] Nursing  [] Other:     Wound Identification:  Wound Type: pressure  Contributing Factors: chronic pressure, shear force, and fall at home prior to admission    Wound History: Nursing reports that patient was found down at home, estimated down time of 24 hours before patient was found. Patient admitted to [facility] with DTI to the mid upper back and unstageable pressure injury to the sacrococcygeal area. Current Wound Care Treatment:  Recommending 1) continue pressure injury prevention interventions, including low air-loss mattress 2) Border foam dressing to the mid upper back wound, change dressing every 3 days and as needed 3) Triad topical wound treatment to the sacrococcygeal wound, apply 2x daily and as needed to promote both wound healing and autolytic debridement    Patient Goal of Care:  [x] Wound Healing  [] Odor Control  [] Palliative Care  [] Pain Control   [] Other:         PAST MEDICAL HISTORY        Diagnosis Date    CAD (coronary artery disease)     CHF (congestive heart failure) ([address] [facility])     Diabetes mellitus ([address] [facility])     Hyperlipidemia     Hypertension        PAST SURGICAL HISTORY    Past Surgical History:   Procedure Laterality Date    COLONOSCOPY         FAMILY HISTORY    No family history on file.     SOCIAL HISTORY    Social History     Tobacco Use    Smoking status: Former    Smokeless tobacco: Never   Vaping Use    Vaping Use: Never used   Substance Use Topics    Alcohol use: No    Drug use: No       ALLERGIES    Allergies   Allergen Reactions    Lisinopril        MEDICATIONS    No current facility-administered medications on file prior to

## 2023-11-27 NOTE — CARE COORDINATION
[facility] or [facility]. SNF list provided. FOC offered. Per Dtr she is going to call APS her self regarding her mother. LSW called [facility] and explain the situation of pt's wife and home conditions. Kristie Mcelroy is following. LSW will follow.      Electronically signed by MAULIK Swann on 11/27/2023 at 10:14 AM

## 2023-11-27 NOTE — PROGRESS NOTES
Cardiology progress note    Patient Name: Dilshad Sarah Date: 2023 12:57 PM  MR #: [id number]  : 1940    Attending Physician: Carmen Aase, DO  Reason for consult: Afib    History of Presenting Illness:      Penny Romo is a 80 y.o. male on hospital day 3 with a history of atrial fibrillation on Eliquis for cardioembolic prophylaxis, CAD with remote PCI, hypertension, hyperlipidemia, MEEK, who was brought to the hospital after being found down. Currently in ICU intubated, cardiology consulted for management of A-fib  . History Obtained From:  patient, electronic medical record    Per electronic medical records  Patient was found bradycardic, patient was given atropine which switch patient into A-fib with RVR  Patient was hypoglycemic and hypoxic    Currently rates into the 80s  Patient intubated and sedated  =============  Hospital course  2023  Patient now in 1 W. Patient laying in bed looks comfortable  Confused  Telemetry atrial fibrillation rates 80s to 90s    2023  Patient still intubated and sedated  Not on pressors  Diltiazem drip turned off  Currently A-fib well-controlled 80s to 90s      History:      Past Medical History:   Diagnosis Date    CAD (coronary artery disease)     CHF (congestive heart failure) ([address])     Diabetes mellitus ([address])     Hyperlipidemia     Hypertension      Past Surgical History:   Procedure Laterality Date    COLONOSCOPY       Family History  No family history on file. [] Unable to obtain due to ventilated and/ or neurologic status  Social History     Socioeconomic History    Marital status:      Spouse name: Not on file    Number of children: Not on file    Years of education: Not on file    Highest education level: Not on file   Occupational History    Not on file   Tobacco Use    Smoking status: Former    Smokeless tobacco: Never   Vaping Use    Vaping Use: Never used   Substance and Sexual Activity    Alcohol use: No    Drug use:  No

## 2023-11-27 NOTE — PROGRESS NOTES
[facility]   Facility/Department: [facility]  Speech Language Pathology  Initial Speech/Language/Cognitive Assessment    NAME:Richar Berumen  : 1940 (80 y.o.)   [x]   confirmed    MRN: [id number]  [id number]  ADMISSION DATE: 2023  PATIENT DIAGNOSIS(ES): Respiratory distress [R06.03]  Hypoglycemia [E16.2]  Atrial fibrillation with rapid ventricular response ([address]) [I48.91]  Altered mental status, unspecified altered mental status type [R41.82]  Respiratory failure with hypoxia, unspecified chronicity ([address]) [J96.91]  Chief Complaint   Patient presents with    Altered Mental Status     Pt found unresponsive     Patient Active Problem List    Diagnosis Date Noted    Altered mental status 2023    Persistent atrial fibrillation ([address]) 2018    High risk medication use 2018    Pulmonary hypertension ([address]) 2018    Obesity due to excess calories 2018    Coronary artery disease involving native coronary artery 2018    Atrial fibrillation with rapid ventricular response ([address]) 10/19/2022    Type 2 diabetes mellitus with hypoglycemia and coma, with long-term current use of insulin ([address])     Acute metabolic encephalopathy     Hypoglycemia 2023    Respiratory distress 2023     Past Medical History:   Diagnosis Date    CAD (coronary artery disease)     CHF (congestive heart failure) ([address])     Diabetes mellitus ([address])     Hyperlipidemia     Hypertension      Past Surgical History:   Procedure Laterality Date    COLONOSCOPY         Date of Onset: 2023    Date of Evaluation: 2023   Evaluating Therapist: Kandis Clements, SLP        Diagnosis: Patient presents with moderate dysarthria characterized by imprecise slurred speech, blended word boundaries which impacted intelligibility.   Patient presents with moderate cognitive linguistic impairment characterized by decreased comprehension of 2 step directions and mid level yes/no reinforcement    Treatment/Goals  Short Term Goals  Time Frame for Short Term Goals: 1 week  Goal 1: Patient will repeat phrases-sentences using slow rate and over-articulation to promote speech intelligibility during structured tasks with SLP so that they may functionally communicate and express safety/medical concerns across all environments with 85% accuracy. Goal 2: Pt will complete OM drills paired with speech sounds to improve speech intelligibility x10/each and expression of their complex thoughts, needs, feelings, and ideas. Goal 3: Pt will follow 2 step verbal directions to increase the pt's ability to follow directions provided by caregivers for safe follow through with ADLs with 90% accuracy. Goal 4: Pt will answer mid level yes/no questions to assist the caregiver in obtaining important information regarding the patient's personal, medical, and safety needs with 85% accuracy. Goal 5: Pt will name 10/10 items in a concrete category with min verbal cues to promote semantic organization, neuroplasticity, and the efficiency of word finding for expression of the patient's wants, needs, feelings, and ideas. Goal 6: Assess problem solving and memory; add goals accordingly. Long Term Goals  Time Frame for Long Term Goals: 1-2 weeks  Goal 1: Pt will improve his Speech Intelligibility to supervision level for effective communication of wants, needs, feelings, ideas, and medical/safety information with familiar and unfamiliar listeners. Goal 2: Pt will demonstrate functional cognitive-linguistic abilities in all opportunities with supervision in order to safely complete ADLs. Safety Devices  Safety Devices  Safety Devices in place: Yes  Type of devices: Bed alarm in place;Call light within reach    Pain Assessment  Patient does not c/o pain. Pain Re-assessment  Patient does not c/o pain.     Therapy Time  SLP Individual Minutes  Time In: 4411  Time Out: 2211  Minutes: 16             Signature:

## 2023-11-27 NOTE — FLOWSHEET NOTE
Patient passed bedside swallow with thin liquids and soft food. Dr. Toña Mcnair aware, I also asked about PT/OT and speech. Pt was extubated yesterday on 11/26/23.

## 2023-11-27 NOTE — PROCEDURES
[facility]   Facility/Department: [facility]  Speech Language Pathology  Modified Barium Swallow (MBS)    Fredis Mendez  : 1940 (80 y.o.)   [x]   confirmed    MRN: [id number]  ROOM: [place] [address]  ADMISSION DATE: 2023  PATIENT DIAGNOSIS(ES): Respiratory distress [R06.03]  Hypoglycemia [E16.2]  Atrial fibrillation with rapid ventricular response ([address]) [I48.91]  Altered mental status, unspecified altered mental status type [R41.82]  Respiratory failure with hypoxia, unspecified chronicity ([address]) [J96.91]  Chief Complaint   Patient presents with    Altered Mental Status     Pt found unresponsive     Patient Active Problem List    Diagnosis Date Noted    Altered mental status 2023    Persistent atrial fibrillation ([address]) 2018    High risk medication use 2018    Pulmonary hypertension ([address]) 2018    Obesity due to excess calories 2018    Coronary artery disease involving native coronary artery 2018    Atrial fibrillation with rapid ventricular response ([address]) 10/19/2022    Type 2 diabetes mellitus with hypoglycemia and coma, with long-term current use of insulin ([address])     Acute metabolic encephalopathy     Hypoglycemia 2023    Respiratory distress 2023     Past Medical History:   Diagnosis Date    CAD (coronary artery disease)     CHF (congestive heart failure) ([address])     Diabetes mellitus ([address])     Hyperlipidemia     Hypertension      Past Surgical History:   Procedure Laterality Date    COLONOSCOPY       Allergies   Allergen Reactions    Lisinopril        Date of Onset:  23      Date of Evaluation: 2023   Evaluating Therapist: NOÉ High      DYSPHAGIA DIAGNOSIS:  Moderate oropharyngeal dysphagia    DIET RECOMMENDATIONS:  Solid consistency: Pureed  Liquid consistency:  Thin  Liquid administration via: Cup  Medication administration: Whole in liquids   Supervision: 1:1 assist    Compensatory airway   Puree, nectar after soft solids 2 Material enters the airway, remains above the vocal folds, and is ejected from the airway    3 Material enters the airway, remains above the vocal folds, and is not ejected from the airway    4 Material enters the airway, contacts the vocal folds, and is ejected from the airway    5 Material enters the airway, contacts the vocal folds, and is not ejected from the airway    6 Material enters the airway, passes below the vocal folds and is ejected into the larynx or out of the airway    7 Material enters the airway, passes below the vocal folds, and is not ejected from the trachea despite effort    8 Material enters the airway, passes below the vocal folds, and no effort is made to eject. PHARYNGEAL STAGE IMPRESSION:  Pt p/w moderate pharyngeal dysphagia characterized by decreased anterior hyoid excursion, laryngeal elevation, base of tongue retraction and pharyngeal contraction resulting in mild-mod residue cleared to min. Pt had flash penetration of puree x1, nectar  x1 after soft solid trial. Pt had no aspiration. CERVICAL ESOPHAGEAL STAGE :   Esophageal backflow into upper esophagus: All trialed consistencies           THERAPY RECOMMENDATIONS:   Therapy is recommended to:  Assess tolerance of recommended diet  Improve oral motor strength and range of motion  Improve tongue base retraction  Improve laryngeal strength and range of motion  Address cricopharyngeal dysfunction      Nursing notified: jaiden      PLAN OF CARE:   Long Term Goals:    Patient will tolerate least restrictive diet with no overt s/s of difficulty or aspiration. Short Term Goals:   Pt to be seen  2 -3x/week during LOS or until goals met    1. Pt will complete oral motor ROM and strengthening exercises (labial/buccal) x10 each, in order to strengthen lingual/labial/buccal musculature to promote safety and efficiency of oral phase of swallow and decrease risk for pocketing.    2.  Pt

## 2023-11-27 NOTE — PROGRESS NOTES
[facility]   Facility/Department: [facility]  Speech Language Pathology  Clinical Bedside Swallow Evaluation    NAME:Richar Carpio  : 1940 (80 y.o.)   [x]   confirmed    MRN: [id number]  ROOM: [place] [address]  ADMISSION DATE: 2023  PATIENT DIAGNOSIS(ES): Respiratory distress [R06.03]  Hypoglycemia [E16.2]  Atrial fibrillation with rapid ventricular response ([address]) [I48.91]  Altered mental status, unspecified altered mental status type [R41.82]  Respiratory failure with hypoxia, unspecified chronicity ([address]) [J96.91]  Chief Complaint   Patient presents with    Altered Mental Status     Pt found unresponsive     Patient Active Problem List    Diagnosis Date Noted    Altered mental status 2023    Persistent atrial fibrillation ([address]) 2018    High risk medication use 2018    Pulmonary hypertension ([address]) 2018    Obesity due to excess calories 2018    Coronary artery disease involving native coronary artery 2018    Atrial fibrillation with rapid ventricular response ([address]) 10/19/2022    Type 2 diabetes mellitus with hypoglycemia and coma, with long-term current use of insulin ([address])     Acute metabolic encephalopathy     Hypoglycemia 2023    Respiratory distress 2023     Past Medical History:   Diagnosis Date    CAD (coronary artery disease)     CHF (congestive heart failure) ([address])     Diabetes mellitus ([address])     Hyperlipidemia     Hypertension      Past Surgical History:   Procedure Laterality Date    COLONOSCOPY       Allergies   Allergen Reactions    Lisinopril        DATE ONSET: 2023    Date of Evaluation: 2023   Evaluating Therapist: NOÉ Drew    Dysphagia Diagnosis  Dysphagia Diagnosis: Moderate oral stage dysphagia; Moderate pharyngeal stage dysphagia  Dysphagia Impression : Patient presents with moderate oral dysphagia and suspected pharyngeal dysphagia.   Patient exhibited decreased labial seal which led was tolerating soft and thin, however was noted to cough when using straw. Consistencies Administered: Regular;Pureed;Mildly Thick - cup; Ice Chips; Thin - straw; Thin - cup    Vision and Hearing  Vision  Vision: Impaired  Vision Exceptions: Wears glasses for reading  Hearing  Hearing: Exceptions to [facility]  Hearing Exceptions: Hard of hearing/hearing concerns; No hearing aid    Current Diet level  Current Diet : Soft and Bite-Sized  Current Liquid Diet : Mildly Thick    Oral Motor  Labial: Decreased seal;Impaired coordination;Decreased rate (mild decreased right with labial retraction)  Dentition: Extractions;Limited (no upper dentition; pt has dentures but does not always wear)  Oral Hygiene: Clean;Xerostomia  Lingual: Decreased strength (decreased ROM- generalized)  Velum: No Impairment  Mandible: No impairment    Oral/Pharyngeal Phase  Oral Phase - Comment: Moderate oral dysphagia  Pharyngeal Phase Comment: Suspect pharyngeal dysphagia    PO Trials  Consistency Presented: Pureed; Ice Chips  How Presented: SLP-fed/Presented  How much presented:  (ice chips x 2, applesauce 100%)  Bolus Acceptance: No impairment  Bolus Formation/Control: No impairment  Propulsion: No impairment  Oral Residue: None  Initiation of Swallow:  (suspect no impairment)  Laryngeal Elevation:  (suspect mildly decreased via palpation)  Aspiration Signs/Symptoms: None  Pharyngeal Phase Characteristics: No impairment, issues, or problems      PO Trials 2  Consistency Presented: Regular  How Presented: SLP-fed/Presented  How much presented:  (saltine cracker x 1 trial)  Bolus Acceptance: No impairment  Bolus Formation/Control: Impaired  Type of Impairment: Mastication; Incomplete;Delayed;Piecemeal  Propulsion: Discoordination  Oral Residue: 10-50% of bolus;Palatal;Lingual  Aspiration Signs/Symptoms: None  Pharyngeal Phase Characteristics: Multiple swallows;Easily fatigued      PO Trials 3  Consistency Presented:  Thin  How Presented:

## 2023-11-27 NOTE — PROGRESS NOTES
Neurology Follow up    SUBJECTIVE: Patient extubated successfully and doing well and following all commands. Daughter at the bedside findings discussed. Patient is having some baseline memory issues likely to be a vascular dementia.   Current Facility-Administered Medications   Medication Dose Route Frequency Provider Last Rate Last Admin    metoprolol tartrate (LOPRESSOR) tablet 12.5 mg  12.5 mg Oral BID Aadrsh Coker MD   12.5 mg at 11/27/23 1056    potassium phosphate 15 mmol in sodium chloride 0.9 % 250 mL IVPB  15 mmol IntraVENous Once Maria E Divine, APRN - CNP 62.5 mL/hr at 11/27/23 1056 15 mmol at 11/27/23 1056    0.45 % sodium chloride infusion   IntraVENous Continuous Maria E Divine, APRN - CNP 75 mL/hr at 11/27/23 1055 New Bag at 11/27/23 1055    insulin lispro (HUMALOG) injection vial 0-8 Units  0-8 Units SubCUTAneous 4x Daily AC & HS DENI Lema        insulin glargine (LANTUS) injection vial 10 Units  10 Units SubCUTAneous QAM DENI Lema   10 Units at 11/27/23 1104    ipratropium 0.5 mg-albuterol 2.5 mg (DUONEB) nebulizer solution 1 Dose  1 Dose Inhalation BID Amaris Engle R, DO        ipratropium 0.5 mg-albuterol 2.5 mg (DUONEB) nebulizer solution 1 Dose  1 Dose Inhalation Q4H PRN Amaris Engle R, DO        docusate (COLACE) 50 MG/5ML liquid 100 mg  100 mg Oral BID Alejandra Muñiz MD   100 mg at 11/26/23 2037    polyethylene glycol (GLYCOLAX) packet 17 g  17 g Oral Daily Alejandra Muñiz MD   17 g at 11/26/23 1040    midodrine (PROAMATINE) tablet 5 mg  5 mg Oral TID WC Alejandra Muñiz MD   5 mg at 11/27/23 0801    sodium chloride flush 0.9 % injection 10 mL  10 mL IntraVENous 2 times per day Cydney Reyes MD   10 mL at 11/27/23 0802    sodium chloride flush 0.9 % injection 10 mL  10 mL IntraVENous PRN Cydney Reyes MD        0.9 % sodium chloride infusion   IntraVENous PRN Cydney Reyes MD        ondansetron (ZOFRAN-ODT) disintegrating tablet 4 mg  4 mg Oral Q8H PRN are seen in the bilateral maxillary sinuses, right greater than left. There is advanced mucosal thickening of the bilateral ethmoid sinuses. SOFT TISSUES/SKULL: No acute abnormality of the visualized skull or soft tissues. No acute intracranial abnormality. Moderate chronic senescent change Somewhat advanced changes of acute sinusitis     XR CHEST PORTABLE    Result Date: 11/24/2023  EXAMINATION: ONE XRAY VIEW OF THE CHEST 11/24/2023 1:40 pm COMPARISON: 10/19/2022 AP portable chest. HISTORY: ORDERING SYSTEM PROVIDED HISTORY: ams TECHNOLOGIST PROVIDED HISTORY: Reason for exam:->ams What reading provider will be dictating this exam?->CRC FINDINGS: An orogastric tube courses into the stomach. An endotracheal tube is noted approximately 2.5 cm above the marry. The heart is enlarged. An unfolded appearance of the aorta is noted. The left lower lobe retrocardiac space is within normal limits. No pneumothorax is seen. Cardiomegaly. No evidence for pneumothorax. Other findings as above. Recent Labs     11/24/23  1315 11/25/23  0301 11/25/23  0930 11/25/23  1401 11/26/23  1200 11/27/23  0910   WBC 9.1 8.5  --   --   --  6.8   HGB 13.2* 13.0*   < > 13.8 12.1* 11.6*    143  --   --   --  178    < > = values in this interval not displayed. Recent Labs     11/24/23  1315 11/25/23  0301 11/25/23  0930 11/25/23  1401 11/26/23  1200 11/27/23  0910    139  --   --   --  141   K 4.2 4.5  --   --   --  3.3*    106  --   --   --  108*   CO2 19* 19*  --   --   --  23   BUN 22 22  --   --   --  12   CREATININE 0.82 0.76   < > 0.7* 0.6* 0.63*   GLUCOSE 192* 142*  --   --   --  221*    < > = values in this interval not displayed.        Recent Labs     11/24/23  1315   BILITOT 1.2*   ALKPHOS 137*   AST 68*   ALT 23       Lab Results   Component Value Date/Time    PROTIME 16.7 11/24/2023 01:15 PM    INR 1.3 11/24/2023 01:15 PM     No results found for: \"LITHIUM\", \"DILFRTOT\",

## 2023-11-27 NOTE — ACP (ADVANCE CARE PLANNING)
Advance Care Planning   Healthcare Decision Maker:    Primary Decision Maker: Julia  - Child - [phone]    Click here to complete Healthcare Decision Makers including selection of the Healthcare Decision Maker Relationship (ie \"Primary\"). Today we documented Decision Maker(s) consistent with Legal Next of Kin hierarchy.        If the relationship to the patient does NOT follow our state's Next of Kin hierarchy, the patient MUST complete an ACP Document to allow him/her to act on the patient's behalf. :      Electronically signed by MAULIK Cervantes on 11/27/2023 at 8:48 AM

## 2023-11-27 NOTE — PROGRESS NOTES
Physical Therapy Missed Treatment   Facility/Department: [facility] MED SURG [id number]    NAME: Chivo Hester    : 1940 (63 y.o.)  MRN: [id number]    Account: [de-identified]  Gender: male     Attempted PT eval. Pt declined due to fatigue. Nursing staff notified. Will follow and attempt PT evaluation again at earliest availability.        Aristeo Shields, PT, 23 at 3:36 PM

## 2023-11-27 NOTE — CARE COORDINATION
Case Management Assessment  Initial Evaluation    Date/Time of Evaluation: 11/27/2023 8:44 AM  Assessment Completed by: MAULIK Cervantes    If patient is discharged prior to next notation, then this note serves as note for discharge by case management. Patient Name: Mary Grullon                   YOB: 1940  Diagnosis: Respiratory distress [R06.03]; Hypoglycemia [E16.2]; Atrial fibrillation with rapid ventricular response ([address]) [I48.91]; Altered mental status, unspecified altered mental status type [R41.82]; Respiratory failure with hypoxia, unspecified chronicity ([address]) [J96.91]                   Date / Time: 11/24/2023 12:57 PM    Patient Admission Status: Inpatient   Readmission Risk (Low < 19, Mod (19-27), High > 27): Readmission Risk Score: 16.7    Current PCP: SANTANA Martines CNP  PCP verified by CM? Yes    Chart Reviewed: Yes      History Provided by: Child/Family, Medical Record  Patient Orientation: Place, Person    Patient Cognition: Dementia / Early Alzheimer's    Hospitalization in the last 30 days (Readmission):  No    If yes, Readmission Assessment in CM Navigator will be completed. Advance Directives:      Code Status: Full Code   Patient's Primary Decision Maker is: Legal Next of Kin    Primary Decision Maker: Malia Navarrete - Child - [phone]    Discharge Planning:    Patient lives with: Spouse/Significant Other Type of Home: House  Primary Care Giver: Self  Patient Support Systems include: Spouse/Significant Other, Children   Current Financial resources: Medicare  Current community resources: None  Current services prior to admission: None            Current DME:              Type of Home Care services:  None    ADLS  Prior functional level: Independent in ADLs/IADLs  Current functional level: Other (see comment) (PEND PT/OT)    PT AM-PAC:   /24  OT AM-PAC:   /24    Family can provide assistance at DC:  Yes  Would you like Case Management to discuss the discharge plan with unspecified chronicity ([address]) [G24.87]    IF APPLICABLE: The Patient and/or patient representative Temi Herrera and his family were provided with a choice of provider and agrees with the discharge plan. Freedom of choice list with basic dialogue that supports the patient's individualized plan of care/goals and shares the quality data associated with the providers was provided to:     Patient Representative Name:       The Patient and/or Patient Representative Agree with the Discharge Plan?       Alistair Perez, [facility] [facility]  Case Management Department  Ph: [phone] Fax: [phone]

## 2023-11-28 ENCOUNTER — APPOINTMENT (OUTPATIENT)
Dept: GENERAL RADIOLOGY | Age: 83
End: 2023-11-28
Payer: MEDICARE

## 2023-11-28 PROBLEM — E11.649 HYPOGLYCEMIA DUE TO TYPE 2 DIABETES MELLITUS (HCC): Status: ACTIVE | Noted: 2023-11-28

## 2023-11-28 PROBLEM — J96.91 RESPIRATORY FAILURE WITH HYPOXIA (HCC): Status: ACTIVE | Noted: 2023-11-28

## 2023-11-28 LAB
ALBUMIN SERPL-MCNC: 2.9 G/DL (ref 3.5–4.6)
ANION GAP SERPL CALCULATED.3IONS-SCNC: 11 MEQ/L (ref 9–15)
BASE EXCESS ARTERIAL: -3 (ref -3–3)
BASOPHILS # BLD: 0 K/UL (ref 0–0.2)
BASOPHILS NFR BLD: 0.3 %
BUN SERPL-MCNC: 11 MG/DL (ref 8–23)
CALCIUM IONIZED: 1.3 MMOL/L (ref 1.12–1.32)
CALCIUM SERPL-MCNC: 8.8 MG/DL (ref 8.5–9.9)
CHLORIDE SERPL-SCNC: 109 MEQ/L (ref 95–107)
CO2 SERPL-SCNC: 21 MEQ/L (ref 20–31)
CREAT SERPL-MCNC: 0.58 MG/DL (ref 0.7–1.2)
EOSINOPHIL # BLD: 0.1 K/UL (ref 0–0.7)
EOSINOPHIL NFR BLD: 1.1 %
ERYTHROCYTE [DISTWIDTH] IN BLOOD BY AUTOMATED COUNT: 15.7 % (ref 11.5–14.5)
GLUCOSE BLD-MCNC: 140 MG/DL (ref 70–99)
GLUCOSE BLD-MCNC: 149 MG/DL (ref 70–99)
GLUCOSE BLD-MCNC: 154 MG/DL (ref 70–99)
GLUCOSE BLD-MCNC: 172 MG/DL (ref 70–99)
GLUCOSE BLD-MCNC: 173 MG/DL (ref 70–99)
GLUCOSE SERPL-MCNC: 165 MG/DL (ref 70–99)
HCO3 ARTERIAL: 22.4 MMOL/L (ref 21–29)
HCT VFR BLD AUTO: 37.9 % (ref 42–52)
HCT VFR BLD AUTO: 38 % (ref 41–53)
HGB BLD CALC-MCNC: 13 GM/DL (ref 13.5–17.5)
HGB BLD-MCNC: 12 G/DL (ref 14–18)
LACTATE: 1.38 MMOL/L (ref 0.4–2)
LYMPHOCYTES # BLD: 1.2 K/UL (ref 1–4.8)
LYMPHOCYTES NFR BLD: 18.2 %
MAGNESIUM SERPL-MCNC: 1.5 MG/DL (ref 1.7–2.4)
MCH RBC QN AUTO: 29.6 PG (ref 27–31.3)
MCHC RBC AUTO-ENTMCNC: 31.7 % (ref 33–37)
MCV RBC AUTO: 93.3 FL (ref 79–92.2)
MONOCYTES # BLD: 0.8 K/UL (ref 0.2–0.8)
MONOCYTES NFR BLD: 11.8 %
NEUTROPHILS # BLD: 4.5 K/UL (ref 1.4–6.5)
NEUTS SEG NFR BLD: 68 %
O2 SAT, ARTERIAL: 96 % (ref 93–100)
PCO2 ARTERIAL: 37 MM HG (ref 35–45)
PERFORMED ON: ABNORMAL
PH ARTERIAL: 7.39 (ref 7.35–7.45)
PHOSPHATE SERPL-MCNC: 2.1 MG/DL (ref 2.3–4.8)
PLATELET # BLD AUTO: 191 K/UL (ref 130–400)
PO2 ARTERIAL: 83 MM HG (ref 75–108)
POC CHLORIDE: 109 MEQ/L (ref 99–110)
POC CREATININE: 0.6 MG/DL (ref 0.8–1.3)
POC FIO2: 2
POC SAMPLE TYPE: ABNORMAL
POTASSIUM SERPL-SCNC: 3.7 MEQ/L (ref 3.4–4.9)
POTASSIUM SERPL-SCNC: 3.7 MEQ/L (ref 3.5–5.1)
RBC # BLD AUTO: 4.06 M/UL (ref 4.7–6.1)
SODIUM BLD-SCNC: 142 MEQ/L (ref 136–145)
SODIUM SERPL-SCNC: 141 MEQ/L (ref 135–144)
TCO2 ARTERIAL: 24 MMOL/L (ref 21–32)
WBC # BLD AUTO: 6.5 K/UL (ref 4.8–10.8)

## 2023-11-28 PROCEDURE — 83605 ASSAY OF LACTIC ACID: CPT

## 2023-11-28 PROCEDURE — 99232 SBSQ HOSP IP/OBS MODERATE 35: CPT | Performed by: PSYCHIATRY & NEUROLOGY

## 2023-11-28 PROCEDURE — 2580000003 HC RX 258: Performed by: INTERNAL MEDICINE

## 2023-11-28 PROCEDURE — 99233 SBSQ HOSP IP/OBS HIGH 50: CPT | Performed by: INTERNAL MEDICINE

## 2023-11-28 PROCEDURE — 82803 BLOOD GASES ANY COMBINATION: CPT

## 2023-11-28 PROCEDURE — 6370000000 HC RX 637 (ALT 250 FOR IP): Performed by: INTERNAL MEDICINE

## 2023-11-28 PROCEDURE — 36415 COLL VENOUS BLD VENIPUNCTURE: CPT

## 2023-11-28 PROCEDURE — 93005 ELECTROCARDIOGRAM TRACING: CPT | Performed by: INTERNAL MEDICINE

## 2023-11-28 PROCEDURE — 85025 COMPLETE CBC W/AUTO DIFF WBC: CPT

## 2023-11-28 PROCEDURE — 82330 ASSAY OF CALCIUM: CPT

## 2023-11-28 PROCEDURE — 36600 WITHDRAWAL OF ARTERIAL BLOOD: CPT

## 2023-11-28 PROCEDURE — 80069 RENAL FUNCTION PANEL: CPT

## 2023-11-28 PROCEDURE — 71045 X-RAY EXAM CHEST 1 VIEW: CPT

## 2023-11-28 PROCEDURE — 84132 ASSAY OF SERUM POTASSIUM: CPT

## 2023-11-28 PROCEDURE — 99232 SBSQ HOSP IP/OBS MODERATE 35: CPT | Performed by: INTERNAL MEDICINE

## 2023-11-28 PROCEDURE — 84295 ASSAY OF SERUM SODIUM: CPT

## 2023-11-28 PROCEDURE — 99231 SBSQ HOSP IP/OBS SF/LOW 25: CPT | Performed by: INTERNAL MEDICINE

## 2023-11-28 PROCEDURE — 2500000003 HC RX 250 WO HCPCS: Performed by: INTERNAL MEDICINE

## 2023-11-28 PROCEDURE — 2580000003 HC RX 258: Performed by: NURSE PRACTITIONER

## 2023-11-28 PROCEDURE — 85014 HEMATOCRIT: CPT

## 2023-11-28 PROCEDURE — APPSS15 APP SPLIT SHARED TIME 0-15 MINUTES: Performed by: NURSE PRACTITIONER

## 2023-11-28 PROCEDURE — 6360000002 HC RX W HCPCS: Performed by: INTERNAL MEDICINE

## 2023-11-28 PROCEDURE — 82435 ASSAY OF BLOOD CHLORIDE: CPT

## 2023-11-28 PROCEDURE — 99222 1ST HOSP IP/OBS MODERATE 55: CPT | Performed by: NURSE PRACTITIONER

## 2023-11-28 PROCEDURE — 82948 REAGENT STRIP/BLOOD GLUCOSE: CPT

## 2023-11-28 PROCEDURE — 83735 ASSAY OF MAGNESIUM: CPT

## 2023-11-28 PROCEDURE — 2700000000 HC OXYGEN THERAPY PER DAY

## 2023-11-28 PROCEDURE — 92526 ORAL FUNCTION THERAPY: CPT

## 2023-11-28 PROCEDURE — 2060000000 HC ICU INTERMEDIATE R&B

## 2023-11-28 PROCEDURE — 82565 ASSAY OF CREATININE: CPT

## 2023-11-28 RX ORDER — 0.9 % SODIUM CHLORIDE 0.9 %
INTRAVENOUS SOLUTION INTRAVENOUS CONTINUOUS PRN
Status: COMPLETED | OUTPATIENT
Start: 2023-11-28 | End: 2023-11-28

## 2023-11-28 RX ORDER — DILTIAZEM HYDROCHLORIDE 5 MG/ML
INJECTION INTRAVENOUS
Status: COMPLETED | OUTPATIENT
Start: 2023-11-28 | End: 2023-11-28

## 2023-11-28 RX ORDER — DIGOXIN 0.25 MG/ML
250 INJECTION INTRAMUSCULAR; INTRAVENOUS ONCE
Status: COMPLETED | OUTPATIENT
Start: 2023-11-28 | End: 2023-11-28

## 2023-11-28 RX ORDER — MIDODRINE HYDROCHLORIDE 2.5 MG/1
10 TABLET ORAL
Status: DISCONTINUED | OUTPATIENT
Start: 2023-11-28 | End: 2023-11-29

## 2023-11-28 RX ORDER — DILTIAZEM HYDROCHLORIDE 120 MG/1
120 CAPSULE, COATED, EXTENDED RELEASE ORAL DAILY
Status: DISCONTINUED | OUTPATIENT
Start: 2023-11-28 | End: 2023-12-01 | Stop reason: HOSPADM

## 2023-11-28 RX ADMIN — SODIUM CHLORIDE 250 ML: 9 INJECTION, SOLUTION INTRAVENOUS at 08:47

## 2023-11-28 RX ADMIN — POTASSIUM PHOSPHATE, MONOBASIC POTASSIUM PHOSPHATE, DIBASIC 20 MMOL: 224; 236 INJECTION, SOLUTION, CONCENTRATE INTRAVENOUS at 09:13

## 2023-11-28 RX ADMIN — MIDODRINE HYDROCHLORIDE 5 MG: 5 TABLET ORAL at 08:56

## 2023-11-28 RX ADMIN — MIDODRINE HYDROCHLORIDE 10 MG: 2.5 TABLET ORAL at 09:01

## 2023-11-28 RX ADMIN — APIXABAN 5 MG: 5 TABLET, FILM COATED ORAL at 21:42

## 2023-11-28 RX ADMIN — DOCUSATE SODIUM 100 MG: 50 LIQUID ORAL at 21:42

## 2023-11-28 RX ADMIN — Medication 10 ML: at 08:57

## 2023-11-28 RX ADMIN — Medication 10 ML: at 21:41

## 2023-11-28 RX ADMIN — DOCUSATE SODIUM 100 MG: 50 LIQUID ORAL at 08:56

## 2023-11-28 RX ADMIN — APIXABAN 5 MG: 5 TABLET, FILM COATED ORAL at 08:56

## 2023-11-28 RX ADMIN — MIDODRINE HYDROCHLORIDE 10 MG: 2.5 TABLET ORAL at 17:19

## 2023-11-28 RX ADMIN — DIGOXIN 250 MCG: 250 INJECTION, SOLUTION INTRAMUSCULAR; INTRAVENOUS at 13:48

## 2023-11-28 RX ADMIN — SODIUM CHLORIDE: 4.5 INJECTION, SOLUTION INTRAVENOUS at 01:08

## 2023-11-28 RX ADMIN — DILTIAZEM HYDROCHLORIDE 120 MG: 120 CAPSULE, COATED, EXTENDED RELEASE ORAL at 12:52

## 2023-11-28 RX ADMIN — SODIUM CHLORIDE, PRESERVATIVE FREE 20 MG: 5 INJECTION INTRAVENOUS at 08:57

## 2023-11-28 RX ADMIN — POLYETHYLENE GLYCOL 3350 17 G: 17 POWDER, FOR SOLUTION ORAL at 08:57

## 2023-11-28 RX ADMIN — METOPROLOL TARTRATE 25 MG: 25 TABLET, FILM COATED ORAL at 08:56

## 2023-11-28 RX ADMIN — MAGNESIUM SULFATE HEPTAHYDRATE 3000 MG: 500 INJECTION, SOLUTION INTRAMUSCULAR; INTRAVENOUS at 17:18

## 2023-11-28 RX ADMIN — DILTIAZEM HYDROCHLORIDE 5 MG: 5 INJECTION INTRAVENOUS at 08:50

## 2023-11-28 ASSESSMENT — ENCOUNTER SYMPTOMS
WHEEZING: 0
VOMITING: 0
COUGH: 1
SHORTNESS OF BREATH: 0
CHEST TIGHTNESS: 0
NAUSEA: 0
TROUBLE SWALLOWING: 0
COLOR CHANGE: 0

## 2023-11-28 ASSESSMENT — PAIN SCALES - WONG BAKER
WONGBAKER_NUMERICALRESPONSE: 0

## 2023-11-28 ASSESSMENT — PAIN SCALES - GENERAL
PAINLEVEL_OUTOF10: 0
PAINLEVEL_OUTOF10: 0

## 2023-11-28 NOTE — PROGRESS NOTES
New specialty bed obtained for patient, replaced, changed all bedding, made patient comfortable. Changed buttocks dressing, moderate drainage noted, red, black eschar on both cheeks, about 11 x 11 cm. Cleaned with new Mepilex. Black eschar, 8 x 8 cm, oval shape along spinal column mid back, oozing small amount of pink drainage. Cleaned with new Mepilex.  Buttocks

## 2023-11-28 NOTE — PLAN OF CARE
Nutrition Problem #1: Increased nutrient needs  Intervention: Food and/or Nutrient Delivery: Modify Current Diet (Change diet order: Diet dysphagia pureed with thin liquids as per results of MBS 11/27  Begin a frozen oral supplement BID)

## 2023-11-28 NOTE — PROGRESS NOTES
Dr Mikala Martinez at bedside new orders for EKG, IV Cardizem, po Midorine 10 mg 250ML Bolus IV. Pt is awake and alert x2 has no complaints o pain or discomfort.

## 2023-11-28 NOTE — PROGRESS NOTES
Patient pulled out both IV access. Blood and fluids cleaned up. Small urine on brief. Video assist started. Made comfortable.

## 2023-11-28 NOTE — PROGRESS NOTES
Patient continuously trying to get out of bed. Thinks his car is in the room. Washed his face, repositioned  him, offered him water, refused.

## 2023-11-28 NOTE — PROGRESS NOTES
Patient has been comfortable since changing beds. Decision by supervisor to relieve the sitter and try the Virtual Assistance again.

## 2023-11-28 NOTE — PROGRESS NOTES
with mod verbal and visual cues. Goal 3: Pt will improve hyolaryngeal elevation by performing laryngeal exercises (effortful swallow x10 each in order to strengthen and establish a more effective swallow. Goal 4: Pt will complete pharyngeal strengthening exercises (chin tuck against resistance, dontrell) x10 each in order to strengthen and establish and more effective swallow. Goal 5: Pt will tolerate therapeutic trials of minced solids with adequate mastication and oral clearance of bolus with no overt s/s of aspiration on 100% trials. Goal 6: Pt will demonstrate recommended strategies for safe and efficient swallow of recommended diet in all given opportunities. 7.  Pt will tolerate the recommended diet level with no s/s of aspiration. Patient's tray in room is minced and moist with mildly thick liquids. Diet recommendation following MBS was puree with thin liquids. Due to medical status change- re-assess at bedside:  Pt was fed puree (pudding) with increased time for bolus manipulation and propulsion, with intermittent oral holding. No pocketing post swallow. No overt s/s of aspiration. Pt consumed 50-75%. Pt was given mildly thick water via straw with suspected delayed initiation of swallow, no overt s/s of aspiration on 4/4 trials. Pt was given thin water via straw with consistent throat clearing and change of vocal quality post swallow on 3/3 trials. Pt exhibited congested cough at baseline, prior to trials, as well as during trials. Rec: downgrade diet to puree with mildly thick liquids, assist feed  Stop if showing fatigue or overt s/s of aspiration  Discussed with Srikanth Diaz. Treatment/Activity Tolerance:  Patient limited by other medical complications    Plan: Alter current POC (see above)    Pain Assessment:  Patient does not c/o pain. Pain Re-assessment:  Patient does not c/o pain.     Patient/Caregiver Education:  Patient educated on session and progression towards goals.  Education needs reinforcement. Safety Devices:  Bed alarm in place, Call light within reach, and Telesitter in use      Therapy Time  SLP Individual Minutes  Time In: 1425  Time Out: [address]  Minutes: 21            Signature: Electronically signed by David Blevins.  Luisito Best, SLP on 11/28/2023 at 3:42 PM

## 2023-11-28 NOTE — PROGRESS NOTES
Cardiology progress note    Patient Name: Cherie Maza Date: 2023 12:57 PM  MR #: [id number]  : 1940    Attending Physician: Allison Castro DO  Reason for consult: Afib    History of Presenting Illness:      Dot Kam is a 80 y.o. male on hospital day 4 with a history of atrial fibrillation on Eliquis for cardioembolic prophylaxis, CAD with remote PCI, hypertension, hyperlipidemia, MEEK, who was brought to the hospital after being found down. Currently in ICU intubated, cardiology consulted for management of A-fib  . History Obtained From:  patient, electronic medical record    Per electronic medical records  Patient was found bradycardic, patient was given atropine which switch patient into A-fib with RVR  Patient was hypoglycemic and hypoxic    Currently rates into the 80s  Patient intubated and sedated  =============  Hospital course  2023  Patient laying in bed  Still confused  Early this morning patient was tachycardic atrial fibrillation in the 110s to 120s  Potassium was increased    2023  Patient now in 1 W. Patient laying in bed looks comfortable  Confused  Telemetry atrial fibrillation rates 80s to 90s    2023  Patient still intubated and sedated  Not on pressors  Diltiazem drip turned off  Currently A-fib well-controlled 80s to 90s      History:      Past Medical History:   Diagnosis Date    CAD (coronary artery disease)     CHF (congestive heart failure) ([address])     Diabetes mellitus ([address])     Hyperlipidemia     Hypertension      Past Surgical History:   Procedure Laterality Date    COLONOSCOPY       Family History  No family history on file.   [] Unable to obtain due to ventilated and/ or neurologic status  Social History     Socioeconomic History    Marital status:      Spouse name: Not on file    Number of children: Not on file    Years of education: Not on file    Highest education level: Not on file   Occupational History    Not on file   Tobacco compliance with the treatment plan as well as documenting on the day of the visit. Thank you for allowing us to participate in the care of this patient. Will continue to follow. Please call if questions or concerns arise. Electronically signed by Chavez Denney MD on 11/28/2023 at 3:51 PM    Please note this report has been partially produced using speech recognition software and may cause contain errors related to that system including grammar, punctuation and spelling as well as words and phrases that may seem inappropriate. If there are questions or concerns please feel free to contact me to clarify.

## 2023-11-28 NOTE — CARE COORDINATION
DARLENEW spoke with Radha Soliman from [place] [facility] who said [facility] is able to accept patient. PT/OT evals are still needed before pre cert can be started.

## 2023-11-28 NOTE — PROGRESS NOTES
Patient agitated, wants to use \"banjo\" to \"shit\". Explained need for PT/OT, very weak. Offered bedpan, does not want it.

## 2023-11-28 NOTE — PROGRESS NOTES
Pt resting in bed refused breakfast.  Pt has no complaints or concerns at this time. Pt heart rate, RR, and PO improved after rapid response.

## 2023-11-28 NOTE — PROGRESS NOTES
Patient still attempting to get out of bed, requiring this RN to sit in room with him. Requesting sitter.

## 2023-11-28 NOTE — PROGRESS NOTES
Comprehensive Nutrition Assessment    Type and Reason for Visit:  Initial, Wound    Nutrition Recommendations/Plan:   Change diet order: Diet dysphagia pureed with thin liquids as per results of MBS 11/27    Begin a frozen oral supplement BID     Malnutrition Assessment:  Malnutrition Status:  Insufficient data (11/28/23 0858)    Context:  Social/Environmental Circumstances     Findings of the 6 clinical characteristics of malnutrition:  Energy Intake:     Weight Loss: Body Fat Loss:        Muscle Mass Loss:       Fluid Accumulation:        Strength:       Nutrition Assessment:    Pt presents with increased nutrient needs for wound healing. Noted pt to be refusing meals, was sound asleep at lunch. Will begin a nutrition supplement. A MBS was completed yesterday with recommendations for dysphagia pureed with thin liquids. Diet order not changed, will change diet to pureed however RD unable to upgrade to thin liquids, discussed with RN Ana Maria Jessica. Nutrition Related Findings:    PMH of CAD, CHF, DMII, HTN, HLD. Found down at home, hypoglycemic, required intubation 11/24-27, trophic TF < 24 hrs. MBS (11/27) = pureed;NCS with mildly thick liquids, confused. Labs/Meds reviewed. ? last BM, (+colace/glycolax) Wound Type: Pressure Injury, Unstageable, Deep Tissue Injury       Current Nutrition Intake & Therapies:    Average Meal Intake: 0%  Average Supplements Intake: None Ordered  ADULT DIET; Dysphagia - Pureed; 5 carb choices (75 gm/meal); Mildly Thick (Nectar);  No Concentrated sweets  ADULT ORAL NUTRITION SUPPLEMENT; Lunch, Dinner; Frozen Oral Supplement    Anthropometric Measures:  Height: 170.2 cm (5' 7\")  Ideal Body Weight (IBW): 148 lbs (67 kg)    Admission Body Weight: 88 kg (194 lb) (bed scale)  Current Body Weight: 96.2 kg (212 lb) (11/27),Weight Source: Bed Scale  Current BMI (kg/m2): 33.2  Usual Body Weight: 101.6 kg (224 lb) (10/2022)  % Weight Change (Calculated): -5.4                    BMI

## 2023-11-28 NOTE — PROGRESS NOTES
K 3.3* 3.7  --    * 109*  --    CO2 23 21  --    BUN 12 11  --    CREATININE 0.63* 0.58* 0.6*   GLUCOSE 221* 165*  --        No results for input(s): \"BILITOT\", \"ALKPHOS\", \"AST\", \"ALT\" in the last 72 hours. Lab Results   Component Value Date/Time    PROTIME 16.7 11/24/2023 01:15 PM    INR 1.3 11/24/2023 01:15 PM     No results found for: \"LITHIUM\", \"DILFRTOT\", \"VALPROATE\"    ASSESSMENT AND PLAN  Encephalopathy with respiratory failure with hypoglycemic event. The timing of this is very unclear though patient an MRI yesterday which does not show anything significant and is quite awake and nonfocal and following commands. Patient may be extubated from neurological standpoint and then we will reexamine now. Patient's clinical status not changed since last seen yesterday. 11/27  Patient successfully extubated and follows all commands. There appears to be some weakness in the left upper extremity. MRI though does not show any new findings. There is no session of any cervical pathology of concern though we will follow this clinically and see if he requires any further intervention. Daughter at the bedside reports memory issues and very poor living conditions at home which requires assessment from . Patient will be transferred to regular medical floor and we will follow-up. Patient's encephalopathy likely to be hypoglycemic encephalopathy is improving. Underlying additional damage to his memory can occur with this. 11/28/2023:  Encephalopathy improved  MRI of the brain negative for any acute findings  Patient with some dysphagia on thickened liquids. Thick secretions noted. Respirations slightly labored. ABGs this morning okay. May benefit from follow-up chest x-ray. History of atrial fibrillation on Eliquis  Possible underlying dementia. Will need further outpatient evaluation. Patient seen and examined encephalopathy mostly resolved.   Patient still has some degree of

## 2023-11-28 NOTE — SIGNIFICANT EVENT
Rapid response was called for hypotension and tachycardia. Heart is in 150s. Patient is asymptomatic. Trinidadian-speaking possible dementia. Repeat blood pressure is 110/67 with map of 70. Spoke with nursing to give Cardizem 5 mg x 1 dose. Bolus 250. Increase midodrine from 5 to 10 mg. EKG shows sinus tachy at 120s.   Continue with lopressor po BID, ABG noted, c/w monitoring on the floor, spoke with nursing    Sinus tachycardic  Mild hypotension  No CP or SOB  Already on [address]  C/w current treatment plan  TCT 45 min  HEENT: AT/NC, PERRLA, no JVD  HEART: s1/s2 wnl w/o s3  LUNG: decrease BS  ABD: soft, NT  EXT: no edema  SKin : no rash  Neuro:alert

## 2023-11-28 NOTE — PROGRESS NOTES
11/27/23 2100   RT Protocol   History Pulmonary Disease 1   Respiratory pattern 0   Breath sounds 2   Cough 0   Indications for Bronchodilator Therapy None   Bronchodilator Assessment Score 3

## 2023-11-29 LAB
ALBUMIN SERPL-MCNC: 2.8 G/DL (ref 3.5–4.6)
ANION GAP SERPL CALCULATED.3IONS-SCNC: 11 MEQ/L (ref 9–15)
BACTERIA BLD CULT ORG #2: NORMAL
BACTERIA BLD CULT: NORMAL
BASE EXCESS ARTERIAL: 1 (ref -3–3)
BASOPHILS # BLD: 0 K/UL (ref 0–0.2)
BASOPHILS NFR BLD: 0.3 %
BNP BLD-MCNC: 6572 PG/ML
BUN SERPL-MCNC: 10 MG/DL (ref 8–23)
CALCIUM IONIZED: 1.26 MMOL/L (ref 1.12–1.32)
CALCIUM SERPL-MCNC: 9.2 MG/DL (ref 8.5–9.9)
CHLORIDE SERPL-SCNC: 108 MEQ/L (ref 95–107)
CO2 SERPL-SCNC: 22 MEQ/L (ref 20–31)
CREAT SERPL-MCNC: 0.49 MG/DL (ref 0.7–1.2)
EKG ATRIAL RATE: 100 BPM
EKG ATRIAL RATE: 115 BPM
EKG ATRIAL RATE: 138 BPM
EKG ATRIAL RATE: 87 BPM
EKG P AXIS: 90 DEGREES
EKG P-R INTERVAL: 192 MS
EKG Q-T INTERVAL: 236 MS
EKG Q-T INTERVAL: 300 MS
EKG Q-T INTERVAL: 336 MS
EKG Q-T INTERVAL: 354 MS
EKG QRS DURATION: 76 MS
EKG QRS DURATION: 80 MS
EKG QRS DURATION: 80 MS
EKG QRS DURATION: 84 MS
EKG QTC CALCULATION (BAZETT): 404 MS
EKG QTC CALCULATION (BAZETT): 422 MS
EKG QTC CALCULATION (BAZETT): 456 MS
EKG QTC CALCULATION (BAZETT): 490 MS
EKG R AXIS: 19 DEGREES
EKG R AXIS: 19 DEGREES
EKG R AXIS: 23 DEGREES
EKG R AXIS: 38 DEGREES
EKG T AXIS: -11 DEGREES
EKG T AXIS: 0 DEGREES
EKG T AXIS: 18 DEGREES
EKG T AXIS: 45 DEGREES
EKG VENTRICULAR RATE: 100 BPM
EKG VENTRICULAR RATE: 109 BPM
EKG VENTRICULAR RATE: 128 BPM
EKG VENTRICULAR RATE: 192 BPM
EOSINOPHIL # BLD: 0.1 K/UL (ref 0–0.7)
EOSINOPHIL NFR BLD: 1.5 %
ERYTHROCYTE [DISTWIDTH] IN BLOOD BY AUTOMATED COUNT: 15.6 % (ref 11.5–14.5)
GLUCOSE BLD-MCNC: 109 MG/DL (ref 70–99)
GLUCOSE BLD-MCNC: 110 MG/DL (ref 70–99)
GLUCOSE BLD-MCNC: 145 MG/DL (ref 70–99)
GLUCOSE BLD-MCNC: 202 MG/DL (ref 70–99)
GLUCOSE BLD-MCNC: 289 MG/DL (ref 70–99)
GLUCOSE BLD-MCNC: 445 MG/DL (ref 70–99)
GLUCOSE SERPL-MCNC: 115 MG/DL (ref 70–99)
HCO3 ARTERIAL: 25.7 MMOL/L (ref 21–29)
HCT VFR BLD AUTO: 35.7 % (ref 42–52)
HCT VFR BLD AUTO: 36 % (ref 41–53)
HGB BLD CALC-MCNC: 12.3 GM/DL (ref 13.5–17.5)
HGB BLD-MCNC: 11.3 G/DL (ref 14–18)
LACTATE: 0.89 MMOL/L (ref 0.4–2)
LYMPHOCYTES # BLD: 1.1 K/UL (ref 1–4.8)
LYMPHOCYTES NFR BLD: 17.2 %
MAGNESIUM SERPL-MCNC: 1.7 MG/DL (ref 1.7–2.4)
MCH RBC QN AUTO: 29.5 PG (ref 27–31.3)
MCHC RBC AUTO-ENTMCNC: 31.7 % (ref 33–37)
MCV RBC AUTO: 93.2 FL (ref 79–92.2)
MONOCYTES # BLD: 0.6 K/UL (ref 0.2–0.8)
MONOCYTES NFR BLD: 9.9 %
NEUTROPHILS # BLD: 4.6 K/UL (ref 1.4–6.5)
NEUTS SEG NFR BLD: 70.8 %
O2 SAT, ARTERIAL: 96 % (ref 93–100)
PCO2 ARTERIAL: 42 MM HG (ref 35–45)
PERFORMED ON: ABNORMAL
PH ARTERIAL: 7.4 (ref 7.35–7.45)
PHOSPHATE SERPL-MCNC: 2.1 MG/DL (ref 2.3–4.8)
PLATELET # BLD AUTO: 183 K/UL (ref 130–400)
PO2 ARTERIAL: 83 MM HG (ref 75–108)
POC CHLORIDE: 111 MEQ/L (ref 99–110)
POC CREATININE: 0.4 MG/DL (ref 0.8–1.3)
POC FIO2: 3
POC SAMPLE TYPE: ABNORMAL
POTASSIUM SERPL-SCNC: 3.8 MEQ/L (ref 3.4–4.9)
POTASSIUM SERPL-SCNC: 3.8 MEQ/L (ref 3.5–5.1)
PROCALCITONIN SERPL IA-MCNC: 0.06 NG/ML (ref 0–0.15)
RBC # BLD AUTO: 3.83 M/UL (ref 4.7–6.1)
SODIUM BLD-SCNC: 143 MEQ/L (ref 136–145)
SODIUM SERPL-SCNC: 141 MEQ/L (ref 135–144)
TCO2 ARTERIAL: 27 MMOL/L (ref 21–32)
WBC # BLD AUTO: 6.5 K/UL (ref 4.8–10.8)

## 2023-11-29 PROCEDURE — 82435 ASSAY OF BLOOD CHLORIDE: CPT

## 2023-11-29 PROCEDURE — 94640 AIRWAY INHALATION TREATMENT: CPT

## 2023-11-29 PROCEDURE — 6370000000 HC RX 637 (ALT 250 FOR IP): Performed by: INTERNAL MEDICINE

## 2023-11-29 PROCEDURE — 94660 CPAP INITIATION&MGMT: CPT

## 2023-11-29 PROCEDURE — 6370000000 HC RX 637 (ALT 250 FOR IP): Performed by: PHYSICIAN ASSISTANT

## 2023-11-29 PROCEDURE — 2700000000 HC OXYGEN THERAPY PER DAY

## 2023-11-29 PROCEDURE — 82803 BLOOD GASES ANY COMBINATION: CPT

## 2023-11-29 PROCEDURE — 84132 ASSAY OF SERUM POTASSIUM: CPT

## 2023-11-29 PROCEDURE — 97167 OT EVAL HIGH COMPLEX 60 MIN: CPT

## 2023-11-29 PROCEDURE — 2580000003 HC RX 258: Performed by: INTERNAL MEDICINE

## 2023-11-29 PROCEDURE — 36415 COLL VENOUS BLD VENIPUNCTURE: CPT

## 2023-11-29 PROCEDURE — 82948 REAGENT STRIP/BLOOD GLUCOSE: CPT

## 2023-11-29 PROCEDURE — 85025 COMPLETE CBC W/AUTO DIFF WBC: CPT

## 2023-11-29 PROCEDURE — 84295 ASSAY OF SERUM SODIUM: CPT

## 2023-11-29 PROCEDURE — 80069 RENAL FUNCTION PANEL: CPT

## 2023-11-29 PROCEDURE — 6360000002 HC RX W HCPCS: Performed by: INTERNAL MEDICINE

## 2023-11-29 PROCEDURE — 83605 ASSAY OF LACTIC ACID: CPT

## 2023-11-29 PROCEDURE — 99232 SBSQ HOSP IP/OBS MODERATE 35: CPT | Performed by: INTERNAL MEDICINE

## 2023-11-29 PROCEDURE — 93010 ELECTROCARDIOGRAM REPORT: CPT | Performed by: INTERNAL MEDICINE

## 2023-11-29 PROCEDURE — 2060000000 HC ICU INTERMEDIATE R&B

## 2023-11-29 PROCEDURE — 82330 ASSAY OF CALCIUM: CPT

## 2023-11-29 PROCEDURE — 85014 HEMATOCRIT: CPT

## 2023-11-29 PROCEDURE — 84145 PROCALCITONIN (PCT): CPT

## 2023-11-29 PROCEDURE — 2500000003 HC RX 250 WO HCPCS: Performed by: INTERNAL MEDICINE

## 2023-11-29 PROCEDURE — 94761 N-INVAS EAR/PLS OXIMETRY MLT: CPT

## 2023-11-29 PROCEDURE — 83735 ASSAY OF MAGNESIUM: CPT

## 2023-11-29 PROCEDURE — 99233 SBSQ HOSP IP/OBS HIGH 50: CPT | Performed by: INTERNAL MEDICINE

## 2023-11-29 PROCEDURE — 82565 ASSAY OF CREATININE: CPT

## 2023-11-29 PROCEDURE — 99232 SBSQ HOSP IP/OBS MODERATE 35: CPT | Performed by: PHYSICIAN ASSISTANT

## 2023-11-29 PROCEDURE — 36600 WITHDRAWAL OF ARTERIAL BLOOD: CPT

## 2023-11-29 PROCEDURE — 83880 ASSAY OF NATRIURETIC PEPTIDE: CPT

## 2023-11-29 RX ORDER — IPRATROPIUM BROMIDE AND ALBUTEROL SULFATE 2.5; .5 MG/3ML; MG/3ML
1 SOLUTION RESPIRATORY (INHALATION)
Status: DISCONTINUED | OUTPATIENT
Start: 2023-11-30 | End: 2023-12-01

## 2023-11-29 RX ORDER — FUROSEMIDE 10 MG/ML
20 INJECTION INTRAMUSCULAR; INTRAVENOUS
Status: COMPLETED | OUTPATIENT
Start: 2023-11-29 | End: 2023-11-30

## 2023-11-29 RX ORDER — IPRATROPIUM BROMIDE AND ALBUTEROL SULFATE 2.5; .5 MG/3ML; MG/3ML
1 SOLUTION RESPIRATORY (INHALATION)
Status: DISCONTINUED | OUTPATIENT
Start: 2023-11-29 | End: 2023-11-29

## 2023-11-29 RX ORDER — MAGNESIUM SULFATE IN WATER 40 MG/ML
2000 INJECTION, SOLUTION INTRAVENOUS ONCE
Status: COMPLETED | OUTPATIENT
Start: 2023-11-29 | End: 2023-11-29

## 2023-11-29 RX ORDER — IPRATROPIUM BROMIDE AND ALBUTEROL SULFATE 2.5; .5 MG/3ML; MG/3ML
1 SOLUTION RESPIRATORY (INHALATION) EVERY 4 HOURS PRN
Status: DISCONTINUED | OUTPATIENT
Start: 2023-11-29 | End: 2023-12-01 | Stop reason: HOSPADM

## 2023-11-29 RX ORDER — MIDODRINE HYDROCHLORIDE 2.5 MG/1
5 TABLET ORAL
Status: DISCONTINUED | OUTPATIENT
Start: 2023-11-29 | End: 2023-11-30

## 2023-11-29 RX ORDER — FUROSEMIDE 10 MG/ML
20 INJECTION INTRAMUSCULAR; INTRAVENOUS ONCE
Status: COMPLETED | OUTPATIENT
Start: 2023-11-29 | End: 2023-11-29

## 2023-11-29 RX ADMIN — METOPROLOL TARTRATE 25 MG: 25 TABLET, FILM COATED ORAL at 20:13

## 2023-11-29 RX ADMIN — APIXABAN 5 MG: 5 TABLET, FILM COATED ORAL at 20:13

## 2023-11-29 RX ADMIN — MIDODRINE HYDROCHLORIDE 10 MG: 2.5 TABLET ORAL at 12:57

## 2023-11-29 RX ADMIN — MAGNESIUM SULFATE HEPTAHYDRATE 2000 MG: 40 INJECTION, SOLUTION INTRAVENOUS at 09:32

## 2023-11-29 RX ADMIN — POLYETHYLENE GLYCOL 3350 17 G: 17 POWDER, FOR SOLUTION ORAL at 09:23

## 2023-11-29 RX ADMIN — Medication 10 ML: at 09:35

## 2023-11-29 RX ADMIN — IPRATROPIUM BROMIDE AND ALBUTEROL SULFATE 1 DOSE: 2.5; .5 SOLUTION RESPIRATORY (INHALATION) at 19:24

## 2023-11-29 RX ADMIN — POTASSIUM PHOSPHATE, MONOBASIC POTASSIUM PHOSPHATE, DIBASIC 20 MMOL: 224; 236 INJECTION, SOLUTION, CONCENTRATE INTRAVENOUS at 09:45

## 2023-11-29 RX ADMIN — IPRATROPIUM BROMIDE AND ALBUTEROL SULFATE 1 DOSE: 2.5; .5 SOLUTION RESPIRATORY (INHALATION) at 13:46

## 2023-11-29 RX ADMIN — METOPROLOL TARTRATE 25 MG: 25 TABLET, FILM COATED ORAL at 09:21

## 2023-11-29 RX ADMIN — MIDODRINE HYDROCHLORIDE 10 MG: 2.5 TABLET ORAL at 09:22

## 2023-11-29 RX ADMIN — DOCUSATE SODIUM 100 MG: 50 LIQUID ORAL at 09:23

## 2023-11-29 RX ADMIN — INSULIN LISPRO 4 UNITS: 100 INJECTION, SOLUTION INTRAVENOUS; SUBCUTANEOUS at 20:40

## 2023-11-29 RX ADMIN — FUROSEMIDE 20 MG: 10 INJECTION, SOLUTION INTRAMUSCULAR; INTRAVENOUS at 09:23

## 2023-11-29 RX ADMIN — FUROSEMIDE 20 MG: 10 INJECTION, SOLUTION INTRAMUSCULAR; INTRAVENOUS at 17:16

## 2023-11-29 RX ADMIN — DILTIAZEM HYDROCHLORIDE 120 MG: 120 CAPSULE, COATED, EXTENDED RELEASE ORAL at 09:22

## 2023-11-29 RX ADMIN — Medication 10 ML: at 20:31

## 2023-11-29 RX ADMIN — METHYLPREDNISOLONE SODIUM SUCCINATE 40 MG: 40 INJECTION, POWDER, FOR SOLUTION INTRAMUSCULAR; INTRAVENOUS at 09:24

## 2023-11-29 RX ADMIN — APIXABAN 5 MG: 5 TABLET, FILM COATED ORAL at 09:22

## 2023-11-29 RX ADMIN — IPRATROPIUM BROMIDE AND ALBUTEROL SULFATE 1 DOSE: 2.5; .5 SOLUTION RESPIRATORY (INHALATION) at 08:50

## 2023-11-29 RX ADMIN — DOCUSATE SODIUM 100 MG: 50 LIQUID ORAL at 20:13

## 2023-11-29 ASSESSMENT — PAIN SCALES - GENERAL
PAINLEVEL_OUTOF10: 0
PAINLEVEL_OUTOF10: 0

## 2023-11-29 ASSESSMENT — PAIN SCALES - WONG BAKER
WONGBAKER_NUMERICALRESPONSE: 0
WONGBAKER_NUMERICALRESPONSE: 0

## 2023-11-29 NOTE — PROGRESS NOTES
Physical Therapy Missed Treatment   Facility/Department: [facility] MED SURG [id number]    NAME: Abhilash Diaz    : 1940 (47 y.o.)  MRN: [id number]    Account: [de-identified]  Gender: male      Pt remains on BiPap - hold PT eval at this time. Will follow and attempt PT evaluation again at earliest availability.        Judith Ibanez, PT, 23 at 2:36 PM

## 2023-11-29 NOTE — PROGRESS NOTES
HGB 11.6* 12.0* 13.0* 11.3* 12.3*   HCT 36.9* 37.9*  --  35.7*  --     191  --  183  --      Recent Labs     11/27/23  0910 11/28/23  0513 11/28/23  0750 11/29/23  0541 11/29/23  0807    141  --  141  --    K 3.3* 3.7  --  3.8  --    * 109*  --  108*  --    CO2 23 21  --  22  --    BUN 12 11  --  10  --    CREATININE 0.63* 0.58* 0.6* 0.49* 0.4*   CALCIUM 8.8 8.8  --  9.2  --    PHOS 1.8* 2.1*  --  2.1*  --      No results for input(s): \"AST\", \"ALT\", \"BILIDIR\", \"BILITOT\", \"ALKPHOS\" in the last 72 hours. No results for input(s): \"INR\" in the last 72 hours. No results for input(s): \"CKTOTAL\", \"TROPONINI\" in the last 72 hours. Urinalysis:      Lab Results   Component Value Date/Time    NITRU Negative 11/24/2023 01:15 PM    WBCUA 10-20 11/24/2023 01:15 PM    BACTERIA Negative 11/24/2023 01:15 PM    RBCUA 0-2 11/24/2023 01:15 PM    BLOODU MODERATE 11/24/2023 01:15 PM    SPECGRAV 1.024 11/24/2023 01:15 PM    GLUCOSEU 250 11/24/2023 01:15 PM       Radiology:  XR CHEST PORTABLE   Final Result   Cardiomegaly and faint right perihilar opacity. This may represent edema or   developing pneumonia         Fluoroscopy modified barium swallow with video   Final Result   Please see separate speech pathology report for full discussion of findings   and recommendations. MRI BRAIN WO CONTRAST   Final Result   No acute intracranial abnormality. Mild parenchymal volume loss. Mild to moderate chronic microvascular disease. CT Head W/O Contrast   Final Result   No acute intracranial abnormality. Moderate chronic senescent change      Somewhat advanced changes of acute sinusitis         CT CSpine W/O Contrast   Final Result   No acute abnormality of the cervical spine. Prominent lymph node at the base   of the neck on the right measures 1.6 x 1.2 cm. XR CHEST PORTABLE   Final Result   Cardiomegaly. No evidence for pneumothorax. Other findings as above. Assessment/Plan:    #Metabolic encephalopathy secondary to prolonged hypoglycemia. Discussed with daughter Chi Mahmood there is some underlying dementia  -Supportive care. Extubated. Starting therapy and discharge planning    Atrial fibrillation with RVR  -Advanced dose Lopressor and start diltiazem. Heart rate now improved  -Optimize electrolytes    Acute respiratory failure with hypoxia suspect secondary to volume overload  -Steroids started by pulmonology  -Continue gentle IV diuresis  -BiPAP and supportive respiratory care as needed  -Diet per SLP  Dysphagia: SLP evaluation appreciated. Recommend GI evaluation    #DMII    -= SSI    Active Hospital Problems    Diagnosis Date Noted    Altered mental status [R41.82] 11/25/2023     Priority: High    Hypoglycemia due to type 2 diabetes mellitus ([address]) [E11.649] 11/28/2023    Respiratory failure with hypoxia [facility]) [J96.91] 11/28/2023    Type 2 diabetes mellitus with hypoglycemia and coma, with long-term current use of insulin ([address]) [F38.916, Z79.4] 12/25/8929    Acute metabolic encephalopathy [L70.34] 11/25/2023    Hypoglycemia [E16.2] 11/25/2023    Respiratory distress [R06.03] 11/24/2023        Additional work up or/and treatment plan may be added today or then after based on clinical progression. I am managing a portion of pt care. Some medical issues are handled by other specialists. Additional work up and treatment should be done in out pt setting by pt PCP and other out pt providers. In addition to examining and evaluating pt, I spent additional time explaining care, normal and abnormal findings, and treatment plan. All of pt questions were answered. Counseling, diet and education were  provided. Case will be discussed with nursing staff when appropriate. Family will be updated if and when appropriate. Diet: ADULT ORAL NUTRITION SUPPLEMENT; Lunch, Dinner; Frozen Oral Supplement  ADULT DIET;  Dysphagia - Pureed; 5 carb choices (75 gm/meal);

## 2023-11-29 NOTE — PROGRESS NOTES
Endocrinology progress note      [facility]  1940  [id number]      Assessment-  Type 2 insulin-dependent diabetes  Noncompliance with glucose monitoring at home per PCP notes  Dementia  Acute metabolic encephalopathy    Plan-  Lantus 10 units mornings   Humalog low dose sliding scale coverage every 4 hours  POCT glucose checks every 4 hours  Monitor glycemic control closely avoid hypoglycemia  Adult dysphagia diet, 5 carbs  Patient may be discharged to SNF from endocrine standpoint      Altered Mental Status      History of Present Illness: This patient is an 51-year-old type 2 insulin-dependent diabetic male who was initially admitted to the intensive care unit and intubated. Doris Castleman He was found unresponsive at home by his family, they stated could have been up to 12 hours that he was unconscious and on the floor. He was severely hypoglycemic in the ED, given IV dextrose with improvement in his glucose levels. He was hypoxic and in respiratory failure intubated and admitted to the intensive care unit. OG tube is in place, tube feedings have been started. I reviewed previous laboratory studies in care everywhere, his [facility] is with [facility]. He had a hemoglobin A1c 3/2023 of 12.6%. He was started on Lantus, continued on pioglitazone and metformin, with improvement in his glycemic control. Hemoglobin A1c 7/2023 improved to 9.7%, most recent A1c 11/2023 is 5.2%. According to his PCP the patient does not check his glucose levels at home despite recommendations by her especially since he is on insulin. In one of his outpatient notes there is indication that he was also taken Novolin 70/30 however I cannot find that on any of his medications list.    Patient is on the medical floor, in bed, with a PCA sitter in the room. He still has altered mental status. He is currently sleeping with a CPAP on. Did not sleep throughout the night, has been agitated. Allergies:    Allergies   Allergen Reactions

## 2023-11-29 NOTE — PROGRESS NOTES
11/29/23 0855   RT Protocol   History Pulmonary Disease 0   Respiratory pattern 6   Breath sounds 2   Cough 0   Indications for Bronchodilator Therapy Decreased or absent breath sounds   Bronchodilator Assessment Score 8

## 2023-11-29 NOTE — PROGRESS NOTES
[facility]   Facility/Department: [address] [place] [address]  Speech Language Pathology    Durga Fearing  1940  [id number]    Date: 11/29/2023      Speech Therapy attempted to see Durga Fearing on this date for a/an:    Treatment    Pt was unable to be seen due to:   Patient is on BiPap - Will re-attempt at next opportunity as pt can tolerate tx.          Electronically signed by NOÉ Meeks on 11/29/23 at 12:10 PM EST

## 2023-11-29 NOTE — PLAN OF CARE
Problem: Discharge Planning  Goal: Discharge to home or other facility with appropriate resources  Outcome: Progressing     Problem: Safety - Medical Restraint  Goal: Remains free of injury from restraints (Restraint for Interference with Medical Device)  Description: INTERVENTIONS:  1. Determine that other, less restrictive measures have been tried or would not be effective before applying the restraint  2. Evaluate the patient's condition at the time of restraint application  3. Inform patient/family regarding the reason for restraint  4. Q2H: Monitor safety, psychosocial status, comfort, nutrition and hydration  Outcome: Progressing     Problem: Pain  Goal: Verbalizes/displays adequate comfort level or baseline comfort level  Outcome: Progressing     Problem: Safety - Adult  Goal: Free from fall injury  Outcome: Progressing     Problem: Skin/Tissue Integrity  Goal: Absence of new skin breakdown  Description: 1. Monitor for areas of redness and/or skin breakdown  2. Assess vascular access sites hourly  3. Every 4-6 hours minimum:  Change oxygen saturation probe site  4. Every 4-6 hours:  If on nasal continuous positive airway pressure, respiratory therapy assess nares and determine need for appliance change or resting period.   Outcome: Progressing     Problem: ABCDS Injury Assessment  Goal: Absence of physical injury  Outcome: Progressing     Problem: Chronic Conditions and Co-morbidities  Goal: Patient's chronic conditions and co-morbidity symptoms are monitored and maintained or improved  Outcome: Progressing     Problem: Nutrition Deficit:  Goal: Optimize nutritional status  11/29/2023 0053 by Lisa Prado RN  Outcome: Progressing  11/28/2023 1353 by Paula Constantino RD, LD  Flowsheets (Taken 11/28/2023 1353)  Nutrient intake appropriate for improving, restoring, or maintaining nutritional needs:   Assess nutritional status and recommend course of action   Monitor oral intake, labs, and treatment

## 2023-11-29 NOTE — PROGRESS NOTES
[facility] OCCUPATIONAL THERAPY EVALUATION - ACUTE     NAME: Sidney Huff  : 1940 (42 y.o.)  MRN: [id number]  CODE STATUS: Full Code  Room: T833/X161-71    Date of Service: 2023    Patient Diagnosis(es): Respiratory distress [R06.03]  Hypoglycemia [E16.2]  Atrial fibrillation with rapid ventricular response ([address]) [I48.91]  Altered mental status, unspecified altered mental status type [R41.82]  Respiratory failure with hypoxia, unspecified chronicity ([address]) [J96.91]   Patient Active Problem List    Diagnosis Date Noted    Altered mental status 2023    Persistent atrial fibrillation ([address]) 2018    High risk medication use 2018    Pulmonary hypertension ([address]) 2018    Obesity due to excess calories 2018    Coronary artery disease involving native coronary artery 2018    Atrial fibrillation with rapid ventricular response ([address]) 10/19/2022    Hypoglycemia due to type 2 diabetes mellitus ([address]) 2023    Respiratory failure with hypoxia ([address]) 2023    Type 2 diabetes mellitus with hypoglycemia and coma, with long-term current use of insulin ([address]) 15/75/7568    Acute metabolic encephalopathy     Hypoglycemia 2023    Respiratory distress 2023        Past Medical History:   Diagnosis Date    CAD (coronary artery disease)     CHF (congestive heart failure) ([address])     Diabetes mellitus ([address])     Hyperlipidemia     Hypertension      Past Surgical History:   Procedure Laterality Date    COLONOSCOPY          Restrictions  Restrictions/Precautions: Fall Risk              Safety Devices: Safety Devices  Type of Devices: All fall risk precautions in place;Call light within reach; Left in bed     Patient's date of birth confirmed: Yes    General:       Subjective:Pt pleasant and cooperative.           Pain at start of treatment: No    Pain at end of treatment: No    Location: N/A  Description: N/A  Nursing notified: No  RN: N/A  Intervention: taking off regular upper body clothing?: A Lot  How much help is needed for taking care of personal grooming?: A Lot  How much help for eating meals?: A Little  AM-[facility] Inpatient Daily Activity Raw Score: 11  AM-[facility]AC Inpatient ADL T-Scale Score : 29.04  ADL Inpatient CMS 0-100% Score: 70.42    Therapy key for assistance levels -   Independent/Mod I = Pt. is able to perform task with no assistance but may require a device   Stand by assistance = Pt. does not perform task at an independent level but does not need physical assistance, requires verbal cues  Minimal, Moderate, Maximal Assistance = Pt. requires physical assistance (25%, 50%, 75% assist from helper) for task but is able to actively participate in task   Dependent = Pt. requires total assistance with task and is not able to actively participate with task completion     Plan:  Occupational Therapy Plan  Times Per Week: 1-4x/wk  Current Treatment Recommendations: Strengthening, ROM, Balance training, Functional mobility training, Endurance training, Neuromuscular re-education, Self-Care / ADL, Safety education & training    Goals:   Patient will:    - Improve functional endurance to tolerate/complete 12-20 mins of ADL's  - Be Min A in UB ADLs   - Be Mod A in LB ADLs  - Be Mod A in ADL transfers without LOB  - Be Mod A in toileting tasks  - Improve bilateral UE strength and endurance to Fair in order to participate in self-care activities as projected.     Patient Goal: Patient goals : Not stated at this time      Discussed and agreed upon: Yes Comments:       Therapy Time:   Individual   Time In 0830   Time Out 0848   Minutes 18          Eval: 18 minutes     Electronically signed by:    ASHISH William,   77/82/8888, 9:37 AM Electronically signed by ASHISH William on 08/69/67 at 9:39 AM EST

## 2023-11-29 NOTE — PROGRESS NOTES
Pt is aox4 pwd sitting up In bed eating breakfast.  Pt refused tray and requested apple sauce, nurse fed two apples sauce cups. Pt tolerated well. Pt states he would like to call daughter and gave nurse her number. Nurse called and pt spoke to daughter and requested nurse up date her. Pt is now resting in bed calm and cooperative.

## 2023-11-29 NOTE — PROGRESS NOTES
Cardiology progress note    Patient Name: Tristan Armstrong Date: 2023 12:57 PM  MR #: [id number]  : 1940    Attending Physician: Elan Falcon DO  Reason for consult: Afib    History of Presenting Illness:      Leslie Hatchet is a 80 y.o. male on hospital day 5 with a history of atrial fibrillation on Eliquis for cardioembolic prophylaxis, CAD with remote PCI, hypertension, hyperlipidemia, MEEK, who was brought to the hospital after being found down. Currently in ICU intubated, cardiology consulted for management of A-fib  . History Obtained From:  patient, electronic medical record    Per electronic medical records  Patient was found bradycardic, patient was given atropine which switch patient into A-fib with RVR  Patient was hypoglycemic and hypoxic    Currently rates into the 80s  Patient intubated and sedated  =============  Hospital course  2023  Patient in bed  Looks comfortable  Still confused  Telemetry atrial fibrillation rate controlled into the 80s    2023  Patient laying in bed  Still confused  Early this morning patient was tachycardic atrial fibrillation in the 110s to 120s  Potassium was increased    2023  Patient now in 1 W. Patient laying in bed looks comfortable  Confused  Telemetry atrial fibrillation rates 80s to 90s    2023  Patient still intubated and sedated  Not on pressors  Diltiazem drip turned off  Currently A-fib well-controlled 80s to 90s      History:      Past Medical History:   Diagnosis Date    CAD (coronary artery disease)     CHF (congestive heart failure) ([address])     Diabetes mellitus ([address])     Hyperlipidemia     Hypertension      Past Surgical History:   Procedure Laterality Date    COLONOSCOPY       Family History  No family history on file.   [] Unable to obtain due to ventilated and/ or neurologic status  Social History     Socioeconomic History    Marital status:      Spouse name: Not on file    Number of children:

## 2023-11-29 NOTE — CARE COORDINATION
Plan remains Ivonne Aegis at discharge. Ot evals completed this morning. Awaiting PT evals to start pre cert. Bebeto Alexander ([id number] [facility] liaison) updated.

## 2023-11-29 NOTE — PROGRESS NOTES
[facility]   Facility/Department: [address] [place] [address].  Speech Language Pathology    Ingris Benavidez  1940  [id number]    Date: 11/29/2023      Speech Therapy attempted to see Ingris Reema on this date for a/an:    Treatment    Pt was unable to be seen due to:   Patient is on BiPap Will re-attempt at next opportunity;  when pt is appropriate for skilled tx.          Electronically signed by NOÉ Tejada on 11/29/23 at 3:38 PM EST

## 2023-11-29 NOTE — PROGRESS NOTES
Physical Therapy Missed Treatment   Facility/Department: [facility] MED SURG O839/T020-18    NAME: Chivo Hester    : 1940 (48 y.o.)  MRN: [id number]    Account: [de-identified]  Gender: male      Pt on BiPap - nursing requests hold PT at this time. Nursing staff notified. Will follow and attempt PT evaluation again at earliest availability.        Aristeo Shields, PT, 23 at 11:09 AM

## 2023-11-30 ENCOUNTER — APPOINTMENT (OUTPATIENT)
Dept: GENERAL RADIOLOGY | Age: 83
End: 2023-11-30
Payer: MEDICARE

## 2023-11-30 PROBLEM — G93.40 ENCEPHALOPATHY: Status: ACTIVE | Noted: 2023-11-30

## 2023-11-30 LAB
ALBUMIN SERPL-MCNC: 2.9 G/DL (ref 3.5–4.6)
ANION GAP SERPL CALCULATED.3IONS-SCNC: 8 MEQ/L (ref 9–15)
BASOPHILS # BLD: 0 K/UL (ref 0–0.2)
BASOPHILS NFR BLD: 0 %
BUN SERPL-MCNC: 12 MG/DL (ref 8–23)
CALCIUM SERPL-MCNC: 9.2 MG/DL (ref 8.5–9.9)
CHLORIDE SERPL-SCNC: 104 MEQ/L (ref 95–107)
CO2 SERPL-SCNC: 28 MEQ/L (ref 20–31)
CREAT SERPL-MCNC: 0.51 MG/DL (ref 0.7–1.2)
EOSINOPHIL # BLD: 0 K/UL (ref 0–0.7)
EOSINOPHIL NFR BLD: 0 %
ERYTHROCYTE [DISTWIDTH] IN BLOOD BY AUTOMATED COUNT: 15.2 % (ref 11.5–14.5)
GLUCOSE BLD-MCNC: 284 MG/DL (ref 70–99)
GLUCOSE BLD-MCNC: 291 MG/DL (ref 70–99)
GLUCOSE BLD-MCNC: 310 MG/DL (ref 70–99)
GLUCOSE BLD-MCNC: 450 MG/DL (ref 70–99)
GLUCOSE SERPL-MCNC: 254 MG/DL (ref 70–99)
HCT VFR BLD AUTO: 36.1 % (ref 42–52)
HGB BLD-MCNC: 11.6 G/DL (ref 14–18)
LYMPHOCYTES # BLD: 0.5 K/UL (ref 1–4.8)
LYMPHOCYTES NFR BLD: 10.2 %
MAGNESIUM SERPL-MCNC: 1.6 MG/DL (ref 1.7–2.4)
MCH RBC QN AUTO: 29.3 PG (ref 27–31.3)
MCHC RBC AUTO-ENTMCNC: 32.1 % (ref 33–37)
MCV RBC AUTO: 91.2 FL (ref 79–92.2)
MONOCYTES # BLD: 0.4 K/UL (ref 0.2–0.8)
MONOCYTES NFR BLD: 8.1 %
NEUTROPHILS # BLD: 4.3 K/UL (ref 1.4–6.5)
NEUTS SEG NFR BLD: 81.5 %
PERFORMED ON: ABNORMAL
PHOSPHATE SERPL-MCNC: 2.4 MG/DL (ref 2.3–4.8)
PLATELET # BLD AUTO: 182 K/UL (ref 130–400)
POTASSIUM SERPL-SCNC: 3.9 MEQ/L (ref 3.4–4.9)
RBC # BLD AUTO: 3.96 M/UL (ref 4.7–6.1)
SODIUM SERPL-SCNC: 140 MEQ/L (ref 135–144)
WBC # BLD AUTO: 5.3 K/UL (ref 4.8–10.8)

## 2023-11-30 PROCEDURE — 2060000000 HC ICU INTERMEDIATE R&B

## 2023-11-30 PROCEDURE — 6370000000 HC RX 637 (ALT 250 FOR IP): Performed by: INTERNAL MEDICINE

## 2023-11-30 PROCEDURE — 6370000000 HC RX 637 (ALT 250 FOR IP): Performed by: PHYSICIAN ASSISTANT

## 2023-11-30 PROCEDURE — 2700000000 HC OXYGEN THERAPY PER DAY

## 2023-11-30 PROCEDURE — 36415 COLL VENOUS BLD VENIPUNCTURE: CPT

## 2023-11-30 PROCEDURE — 99232 SBSQ HOSP IP/OBS MODERATE 35: CPT | Performed by: INTERNAL MEDICINE

## 2023-11-30 PROCEDURE — 92507 TX SP LANG VOICE COMM INDIV: CPT

## 2023-11-30 PROCEDURE — 80069 RENAL FUNCTION PANEL: CPT

## 2023-11-30 PROCEDURE — 92526 ORAL FUNCTION THERAPY: CPT

## 2023-11-30 PROCEDURE — 85025 COMPLETE CBC W/AUTO DIFF WBC: CPT

## 2023-11-30 PROCEDURE — 6360000002 HC RX W HCPCS: Performed by: INTERNAL MEDICINE

## 2023-11-30 PROCEDURE — 2580000003 HC RX 258: Performed by: INTERNAL MEDICINE

## 2023-11-30 PROCEDURE — 71045 X-RAY EXAM CHEST 1 VIEW: CPT

## 2023-11-30 PROCEDURE — 94761 N-INVAS EAR/PLS OXIMETRY MLT: CPT

## 2023-11-30 PROCEDURE — 94640 AIRWAY INHALATION TREATMENT: CPT

## 2023-11-30 PROCEDURE — 97163 PT EVAL HIGH COMPLEX 45 MIN: CPT

## 2023-11-30 PROCEDURE — 99231 SBSQ HOSP IP/OBS SF/LOW 25: CPT | Performed by: NURSE PRACTITIONER

## 2023-11-30 PROCEDURE — 83735 ASSAY OF MAGNESIUM: CPT

## 2023-11-30 PROCEDURE — 99232 SBSQ HOSP IP/OBS MODERATE 35: CPT | Performed by: PHYSICIAN ASSISTANT

## 2023-11-30 PROCEDURE — 97162 PT EVAL MOD COMPLEX 30 MIN: CPT

## 2023-11-30 RX ORDER — INSULIN GLARGINE 100 [IU]/ML
20 INJECTION, SOLUTION SUBCUTANEOUS EVERY MORNING
Status: DISCONTINUED | OUTPATIENT
Start: 2023-12-01 | End: 2023-12-01 | Stop reason: HOSPADM

## 2023-11-30 RX ORDER — MIDODRINE HYDROCHLORIDE 5 MG/1
5 TABLET ORAL 2 TIMES DAILY WITH MEALS
Status: DISCONTINUED | OUTPATIENT
Start: 2023-11-30 | End: 2023-12-01

## 2023-11-30 RX ORDER — FUROSEMIDE 10 MG/ML
20 INJECTION INTRAMUSCULAR; INTRAVENOUS
Status: COMPLETED | OUTPATIENT
Start: 2023-11-30 | End: 2023-12-01

## 2023-11-30 RX ORDER — MAGNESIUM SULFATE IN WATER 40 MG/ML
2000 INJECTION, SOLUTION INTRAVENOUS ONCE
Status: COMPLETED | OUTPATIENT
Start: 2023-11-30 | End: 2023-11-30

## 2023-11-30 RX ORDER — PREDNISONE 20 MG/1
40 TABLET ORAL DAILY
Status: DISCONTINUED | OUTPATIENT
Start: 2023-11-30 | End: 2023-12-01 | Stop reason: HOSPADM

## 2023-11-30 RX ORDER — MAGNESIUM SULFATE 1 G/100ML
1000 INJECTION INTRAVENOUS ONCE
Status: COMPLETED | OUTPATIENT
Start: 2023-11-30 | End: 2023-11-30

## 2023-11-30 RX ADMIN — DILTIAZEM HYDROCHLORIDE 120 MG: 120 CAPSULE, COATED, EXTENDED RELEASE ORAL at 09:21

## 2023-11-30 RX ADMIN — FUROSEMIDE 20 MG: 10 INJECTION, SOLUTION INTRAMUSCULAR; INTRAVENOUS at 16:59

## 2023-11-30 RX ADMIN — METOPROLOL TARTRATE 25 MG: 25 TABLET, FILM COATED ORAL at 21:42

## 2023-11-30 RX ADMIN — PREDNISONE 40 MG: 20 TABLET ORAL at 09:21

## 2023-11-30 RX ADMIN — MAGNESIUM SULFATE HEPTAHYDRATE 2000 MG: 40 INJECTION, SOLUTION INTRAVENOUS at 10:31

## 2023-11-30 RX ADMIN — MAGNESIUM SULFATE HEPTAHYDRATE 1000 MG: 1 INJECTION, SOLUTION INTRAVENOUS at 12:08

## 2023-11-30 RX ADMIN — INSULIN LISPRO 4 UNITS: 100 INJECTION, SOLUTION INTRAVENOUS; SUBCUTANEOUS at 12:10

## 2023-11-30 RX ADMIN — MIDODRINE HYDROCHLORIDE 5 MG: 5 TABLET ORAL at 09:21

## 2023-11-30 RX ADMIN — FUROSEMIDE 20 MG: 10 INJECTION, SOLUTION INTRAMUSCULAR; INTRAVENOUS at 06:51

## 2023-11-30 RX ADMIN — POLYETHYLENE GLYCOL 3350 17 G: 17 POWDER, FOR SOLUTION ORAL at 09:22

## 2023-11-30 RX ADMIN — APIXABAN 5 MG: 5 TABLET, FILM COATED ORAL at 09:21

## 2023-11-30 RX ADMIN — IPRATROPIUM BROMIDE AND ALBUTEROL SULFATE 1 DOSE: 2.5; .5 SOLUTION RESPIRATORY (INHALATION) at 05:16

## 2023-11-30 RX ADMIN — MIDODRINE HYDROCHLORIDE 5 MG: 5 TABLET ORAL at 16:59

## 2023-11-30 RX ADMIN — Medication 10 ML: at 21:42

## 2023-11-30 RX ADMIN — INSULIN LISPRO 4 UNITS: 100 INJECTION, SOLUTION INTRAVENOUS; SUBCUTANEOUS at 09:22

## 2023-11-30 RX ADMIN — INSULIN LISPRO 6 UNITS: 100 INJECTION, SOLUTION INTRAVENOUS; SUBCUTANEOUS at 21:44

## 2023-11-30 RX ADMIN — APIXABAN 5 MG: 5 TABLET, FILM COATED ORAL at 21:42

## 2023-11-30 RX ADMIN — INSULIN LISPRO 6 UNITS: 100 INJECTION, SOLUTION INTRAVENOUS; SUBCUTANEOUS at 16:59

## 2023-11-30 RX ADMIN — METOPROLOL TARTRATE 25 MG: 25 TABLET, FILM COATED ORAL at 09:21

## 2023-11-30 RX ADMIN — IPRATROPIUM BROMIDE AND ALBUTEROL SULFATE 1 DOSE: 2.5; .5 SOLUTION RESPIRATORY (INHALATION) at 19:34

## 2023-11-30 RX ADMIN — Medication 10 ML: at 09:22

## 2023-11-30 RX ADMIN — DOCUSATE SODIUM 100 MG: 50 LIQUID ORAL at 09:21

## 2023-11-30 RX ADMIN — INSULIN GLARGINE 10 UNITS: 100 INJECTION, SOLUTION SUBCUTANEOUS at 09:21

## 2023-11-30 RX ADMIN — DOCUSATE SODIUM 100 MG: 50 LIQUID ORAL at 21:42

## 2023-11-30 RX ADMIN — INSULIN LISPRO 6 UNITS: 100 INJECTION, SOLUTION INTRAVENOUS; SUBCUTANEOUS at 17:32

## 2023-11-30 ASSESSMENT — ENCOUNTER SYMPTOMS
CHEST TIGHTNESS: 0
TROUBLE SWALLOWING: 0
SHORTNESS OF BREATH: 0
NAUSEA: 0
COLOR CHANGE: 0
COUGH: 1
WHEEZING: 0
VOMITING: 0

## 2023-11-30 NOTE — PROGRESS NOTES
paired with speech sounds to improve speech intelligibility x10/each and expression of their complex thoughts, needs, feelings, and ideas. Limited trials due to pts inability to stay on task. Pt imitated x3 bilabial drills. Pt continued to ask for applesauce after several redirections and explanations as to why more applesauce was not available at this time. Goal 3: Pt will follow 2 step verbal directions to increase the pt's ability to follow directions provided by caregivers for safe follow through with ADLs with 90% accuracy. Pt unable to follow 2 step commands this date. Distractible (see subjective)   Goal 4: Pt will answer mid level yes/no questions to assist the caregiver in obtaining important information regarding the patient's personal, medical, and safety needs with 85% accuracy. Pt completed mid level yes/no with 47% acc and mod cues. Distractible (see above)   Goal 5: Pt will name 10/10 items in a concrete category with min verbal cues to promote semantic organization, neuroplasticity, and the efficiency of word finding for expression of the patient's wants, needs, feelings, and ideas. Goal 6: Assess problem solving and memory; add goals accordingly. Not addressed     Short-term Goals  Timeframe for Short-term Goals: 1 week  Goal 1: Pt will complete oral motor ROM and strengthening exercises (labial/buccal) x10 each, in order to strengthen lingual/labial/buccal musculature to promote safety and efficiency of oral phase of swallow and decrease risk for pocketing. Pt unable to adequately complete exercise. Distracted. Goal 2: Pt will complete lingual ROM and strengthening exercises (lingual press, lingual pull backs) x10 each, in order to promote anterior to posterior propulsion of bolus and improve tongue base retraction.   See above   Goal 3: Pt will improve hyolaryngeal elevation by performing laryngeal exercises (effortful swallow x10 each in order to strengthen and establish a more

## 2023-11-30 NOTE — PROGRESS NOTES
Endocrinology progress note      Molina Aguirre  1940  [id number]      Assessment-  Type 2 insulin-dependent diabetes  Noncompliance with glucose monitoring at home per PCP notes  Dementia  Acute metabolic encephalopathy    Plan-  Increase Lantus 20 units mornings if glucose is greater than 150  Humalog low dose sliding scale coverage every 4 hours  POCT glucose checks every 4 hours  Monitor glycemic control closely avoid hypoglycemia  Adult dysphagia diet, 5 carbs  Patient may be discharged to SNF from endocrine standpoint      Altered Mental Status      History of Present Illness: This patient is an 24-year-old type 2 insulin-dependent diabetic male who was initially admitted to the intensive care unit and intubated. Lisandro Sanchez He was found unresponsive at home by his family, they stated could have been up to 12 hours that he was unconscious and on the floor. He was severely hypoglycemic in the ED, given IV dextrose with improvement in his glucose levels. He was hypoxic and in respiratory failure intubated and admitted to the intensive care unit. OG tube is in place, tube feedings have been started. I reviewed previous laboratory studies in care everywhere, his [facility] is with [facility]. He had a hemoglobin A1c 3/2023 of 12.6%. He was started on Lantus, continued on pioglitazone and metformin, with improvement in his glycemic control. Hemoglobin A1c 7/2023 improved to 9.7%, most recent A1c 11/2023 is 5.2%. According to his PCP the patient does not check his glucose levels at home despite recommendations by her especially since he is on insulin. In one of his outpatient notes there is indication that he was also taken Novolin 70/30 however I cannot find that on any of his medications list.  Patient remains inpatient, he is more awake and alert today, eating well. He has steroid-induced hyperglycemia. We will monitor glycemic control closely and make insulin dosing adjustments daily. Allergies:    Allergies findings have all been discussed with Dr Sina Caballero, who agrees with the assessment and plan as described above.      Electronicallysigned by DENI Salgado on 11/30/2023 at 3:15 PM

## 2023-11-30 NOTE — PROGRESS NOTES
Cardiology progress note    Patient Name: Dina Arita Date: 2023 12:57 PM  MR #: [id number]  : 1940    Attending Physician: Ayanna Anne DO  Reason for consult: Afib    History of Presenting Illness:      Rachel Zapata is a 80 y.o. male on hospital day 6 with a history of atrial fibrillation on Eliquis for cardioembolic prophylaxis, CAD with remote PCI, hypertension, hyperlipidemia, MEEK, who was brought to the hospital after being found down. Currently in ICU intubated, cardiology consulted for management of A-fib  . History Obtained From:  patient, electronic medical record    Per electronic medical records  Patient was found bradycardic, patient was given atropine which switch patient into A-fib with RVR  Patient was hypoglycemic and hypoxic    Currently rates into the 80s  Patient intubated and sedated  =============  Hospital course  2023  Patient in bed on nasal cannula  Looks more awake  Denies chest pain or shortness of breath  Telemetry sinus rhythm in the 80s  Cardiac wise patient stable, can be discharged anytime    2023  Patient in bed  Looks comfortable  Still confused  Telemetry atrial fibrillation rate controlled into the 80s    2023  Patient laying in bed  Still confused  Early this morning patient was tachycardic atrial fibrillation in the 110s to 120s  Potassium was increased    2023  Patient now in 3 W.   Patient laying in bed looks comfortable  Confused  Telemetry atrial fibrillation rates 80s to 90s    2023  Patient still intubated and sedated  Not on pressors  Diltiazem drip turned off  Currently A-fib well-controlled 80s to 90s      History:      Past Medical History:   Diagnosis Date    CAD (coronary artery disease)     CHF (congestive heart failure) ([address])     Diabetes mellitus ([address])     Hyperlipidemia     Hypertension      Past Surgical History:   Procedure Laterality Date    COLONOSCOPY       Family History  No family fibrillation. Currently well controlled into the 80s status post diltiazem drip  Altered mental status/encephalopathy  Acute hypoxic respiratory failure. Currently intubated  Hypoglycemia  Diabetes  Obesity  Hyperlipidemia  11/25/2023 EF 55 to 60% mild AS  Plan:   Continue telemetry  Continue diltiazem 120 mg daily  Continue metoprolol 25 mg p.o. twice daily  continue midodrine wean off as tolerated  Keep potassium between 4 and 5 and magnesium above 2  Continue Eliquis  Ischemic evaluation as an outpatient  From cardiology standpoint patient is stable and can be discharged at any time  Comments: On this date 11/30/2023 I have spent 50 minutes reviewing previous notes, test results and face to face with the patient discussing the diagnoses and importance of compliance with the treatment plan as well as documenting on the day of the visit. Thank you for allowing us to participate in the care of this patient. Will continue to follow. Please call if questions or concerns arise. Electronically signed by Carmen Broussard MD on 11/30/2023 at 10:58 AM    Please note this report has been partially produced using speech recognition software and may cause contain errors related to that system including grammar, punctuation and spelling as well as words and phrases that may seem inappropriate. If there are questions or concerns please feel free to contact me to clarify.

## 2023-11-30 NOTE — PROGRESS NOTES
Physical Therapy Med Surg Initial Assessment  Facility/Department: [address] [place] [address]  Room: [place] [id number]       NAME: Rachel Zapata  : 1940 (18 y.o.)  MRN: [id number]  CODE STATUS: Full Code    Date of Service: 2023    Patient Diagnosis(es): Respiratory distress [R06.03]  Hypoglycemia [E16.2]  Atrial fibrillation with rapid ventricular response ([address]) [I48.91]  Altered mental status, unspecified altered mental status type [R41.82]  Respiratory failure with hypoxia, unspecified chronicity [facility]) [J96.91]   Chief Complaint   Patient presents with    Altered Mental Status     Pt found unresponsive     Patient Active Problem List    Diagnosis Date Noted    Altered mental status 2023    Persistent atrial fibrillation ([address]) 2018    High risk medication use 2018    Pulmonary hypertension ([address]) 2018    Obesity due to excess calories 2018    Coronary artery disease involving native coronary artery 2018    Atrial fibrillation with rapid ventricular response ([address]) 10/19/2022    Hypoglycemia due to type 2 diabetes mellitus ([address]) 2023    Respiratory failure with hypoxia ([address]) 2023    Type 2 diabetes mellitus with hypoglycemia and coma, with long-term current use of insulin ([address])     Acute metabolic encephalopathy     Hypoglycemia 2023    Respiratory distress 2023        Past Medical History:   Diagnosis Date    CAD (coronary artery disease)     CHF (congestive heart failure) ([address])     Diabetes mellitus ([address])     Hyperlipidemia     Hypertension      Past Surgical History:   Procedure Laterality Date    COLONOSCOPY         Chart Reviewed: Yes  Patient assessed for rehabilitation services?: Yes    Restrictions:  Restrictions/Precautions: Fall Risk     SUBJECTIVE:   Subjective: Pt agreeableto PT evaluation    Pain   No pain    Prior Level of Function:  Social/Functional History  Lives With: Spouse  Type of Home: House  Home Layout: One

## 2023-11-30 NOTE — PROGRESS NOTES
11/29/23 1924   RT Protocol   History Pulmonary Disease 1   Respiratory pattern 2   Breath sounds 2   Cough 0   Indications for Bronchodilator Therapy Decreased or absent breath sounds   Bronchodilator Assessment Score 5

## 2023-11-30 NOTE — PROGRESS NOTES
Neurology Follow up    SUBJECTIVE: Patient seen and examined for neurology follow-up. Patient more awake today. Denies headache. No vision changes. Nonfocal.  No seizures. Afebrile.   Current Facility-Administered Medications   Medication Dose Route Frequency Provider Last Rate Last Admin    midodrine (PROAMATINE) tablet 5 mg  5 mg Oral BID  Jacki Stephenson MD   5 mg at 11/30/23 0921    predniSONE (DELTASONE) tablet 40 mg  40 mg Oral Daily Jacki Stephenson MD   40 mg at 11/30/23 5395    magnesium sulfate 2000 mg in 50 mL IVPB premix  2,000 mg IntraVENous Once [place], DO 25 mL/hr at 11/30/23 1031 2,000 mg at 11/30/23 1031    Followed by    magnesium sulfate 1000 mg in dextrose 5% 100 mL IVPB  1,000 mg IntraVENous Once [facility], DO        ipratropium 0.5 mg-albuterol 2.5 mg (DUONEB) nebulizer solution 1 Dose  1 Dose Inhalation Q4H PRN Jacki Stephenson MD        ipratropium 0.5 mg-albuterol 2.5 mg (DUONEB) nebulizer solution 1 Dose  1 Dose Inhalation BID RT Jacki Stephenson MD   1 Dose at 11/30/23 0516    metoprolol tartrate (LOPRESSOR) tablet 25 mg  25 mg Oral BID [facility] He[facility] R, DO   25 mg at 11/30/23 3911    dilTIAZem (CARDIZEM CD) extended release capsule 120 mg  120 mg Oral Daily [facility] He[facility] R, DO   120 mg at 11/30/23 0921    insulin lispro (HUMALOG) injection vial 0-8 Units  0-8 Units SubCUTAneous 4x Daily AC & HS [facility]   4 Units at 11/30/23 9002    insulin glargine (LANTUS) injection vial 10 Units  10 Units SubCUTAneous QAM [facility]   10 Units at 11/30/23 0921    docusate (COLACE) 50 MG/5ML liquid 100 mg  100 mg Oral BID Jacki Stephenson MD   100 mg at 11/30/23 0921    polyethylene glycol (GLYCOLAX) packet 17 g  17 g Oral Daily Jacki Stephenson MD   17 g at 11/30/23 4006    sodium chloride flush 0.9 % injection 10 mL  10 mL IntraVENous 2 times per day Mikaela Edge MD   10 mL at 11/30/23 0922    sodium chloride flush 0.9 % injection 10 mL  10 mL light-headedness, numbness and headaches. Psychiatric/Behavioral:  Positive for confusion. Negative for agitation and hallucinations. The patient is not nervous/anxious. Mental Status Exam:             Level of Alertness:   awake oriented x2-3. Funduscopic Exam:     Cranial Nerves              Cranial nerve III           Pupils:  equal, round, reactive to light      Cranial nerves III, IV, VI           Extraocular Movements: intact              Tongue movement:  normal    Motor: Mild weakness notable on the left upper extremity unclear if this is new or old. There is no session of any stroke on MRI though. Drift:  absent  Motor exam is symmetrical 5 out of 5 all extremities bilaterally  Tone:  normal  Abnormal Movements:  absent            Sensory: Unable to assess        Pinprick             Right Upper Extremity:  normal             Left Upper Extremity:  normal             Right Lower Extremity:  normal             Left Lower Extremity:  normal           Vibration                         Touch            Proprioception                 Coordination: Unable to perform                  Gait:                       Casual: Unable to perform          Reflexes:             Deep Tendon Reflexes:             Reflexes are 2 +             Plantar response:                Right:  downgoing               Left:  downgoing    Vascular:  Cardiac Exam:  normal         Echo (TTE) complete (PRN contrast/bubble/strain/3D)    Result Date: 11/25/2023    Left Ventricle: Normal left ventricular systolic function with a visually estimated EF of 50 - 55%. Left ventricle size is normal. Normal wall thickness. Normal wall motion. Normal diastolic function. Aortic Valve: Severely calcified cusp. Mild stenosis of the aortic valve. AV mean gradient is 5 mmHg. AV peak velocity is 1.6 m/s. AV area by continuity VTI is 1.7 cm2. Mitral Valve: Severe regurgitation with a centrally directed jet. Tricuspid Valve:  Moderate

## 2023-11-30 NOTE — PROGRESS NOTES
11/30/23 0516   RT Protocol   History Pulmonary Disease 1   Respiratory pattern 2   Breath sounds 2   Cough 0   Indications for Bronchodilator Therapy Decreased or absent breath sounds   Bronchodilator Assessment Score 5

## 2023-11-30 NOTE — PROGRESS NOTES
encephalopathy, improved  Severe hypoglycemia, improved  A-fib with RVR  History of heart failure  Diabetes          Recommendation  Change Solu-Medrol to prednisone 40 mg daily, complete 5 days of steroid treatment  DuoNeb 4 times daily  Lasix as needed, maintain euvolemic  Watch electrolytes and renal function   TV on, natural light, avoid benzos, pain control, early mobility, and family engagement  PUD prophylaxis  DVT prophylaxis  Decrease midodrine to twice daily  O2 to keep sat 90 to 92%  Pulmonary hygiene             Electronically signed by Donna Duenas MD,  [facility] ,on 11/30/2023 at 7:33 AM

## 2023-12-01 VITALS
WEIGHT: 191 LBS | SYSTOLIC BLOOD PRESSURE: 115 MMHG | RESPIRATION RATE: 20 BRPM | TEMPERATURE: 98.6 F | HEIGHT: 67 IN | DIASTOLIC BLOOD PRESSURE: 66 MMHG | OXYGEN SATURATION: 98 % | HEART RATE: 116 BPM | BODY MASS INDEX: 29.98 KG/M2

## 2023-12-01 LAB
ALBUMIN SERPL-MCNC: 3.1 G/DL (ref 3.5–4.6)
ANION GAP SERPL CALCULATED.3IONS-SCNC: 12 MEQ/L (ref 9–15)
BASOPHILS # BLD: 0 K/UL (ref 0–0.2)
BASOPHILS NFR BLD: 0 %
BUN SERPL-MCNC: 11 MG/DL (ref 8–23)
CALCIUM SERPL-MCNC: 9.2 MG/DL (ref 8.5–9.9)
CHLORIDE SERPL-SCNC: 97 MEQ/L (ref 95–107)
CO2 SERPL-SCNC: 27 MEQ/L (ref 20–31)
CREAT SERPL-MCNC: 0.55 MG/DL (ref 0.7–1.2)
EOSINOPHIL # BLD: 0 K/UL (ref 0–0.7)
EOSINOPHIL NFR BLD: 0 %
ERYTHROCYTE [DISTWIDTH] IN BLOOD BY AUTOMATED COUNT: 15.2 % (ref 11.5–14.5)
GLUCOSE BLD-MCNC: 315 MG/DL (ref 70–99)
GLUCOSE BLD-MCNC: 350 MG/DL (ref 70–99)
GLUCOSE SERPL-MCNC: 372 MG/DL (ref 70–99)
HCT VFR BLD AUTO: 37.6 % (ref 42–52)
HGB BLD-MCNC: 12 G/DL (ref 14–18)
LYMPHOCYTES # BLD: 0.4 K/UL (ref 1–4.8)
LYMPHOCYTES NFR BLD: 4.8 %
MAGNESIUM SERPL-MCNC: 1.7 MG/DL (ref 1.7–2.4)
MCH RBC QN AUTO: 29.3 PG (ref 27–31.3)
MCHC RBC AUTO-ENTMCNC: 31.9 % (ref 33–37)
MCV RBC AUTO: 91.9 FL (ref 79–92.2)
MONOCYTES # BLD: 0.4 K/UL (ref 0.2–0.8)
MONOCYTES NFR BLD: 5.3 %
NEUTROPHILS # BLD: 6.7 K/UL (ref 1.4–6.5)
NEUTS SEG NFR BLD: 89.6 %
PERFORMED ON: ABNORMAL
PERFORMED ON: ABNORMAL
PHOSPHATE SERPL-MCNC: 2.2 MG/DL (ref 2.3–4.8)
PLATELET # BLD AUTO: 177 K/UL (ref 130–400)
POTASSIUM SERPL-SCNC: 3.8 MEQ/L (ref 3.4–4.9)
RBC # BLD AUTO: 4.09 M/UL (ref 4.7–6.1)
SODIUM SERPL-SCNC: 136 MEQ/L (ref 135–144)
WBC # BLD AUTO: 7.5 K/UL (ref 4.8–10.8)

## 2023-12-01 PROCEDURE — 94640 AIRWAY INHALATION TREATMENT: CPT

## 2023-12-01 PROCEDURE — 83735 ASSAY OF MAGNESIUM: CPT

## 2023-12-01 PROCEDURE — 2700000000 HC OXYGEN THERAPY PER DAY

## 2023-12-01 PROCEDURE — 6370000000 HC RX 637 (ALT 250 FOR IP): Performed by: INTERNAL MEDICINE

## 2023-12-01 PROCEDURE — 97535 SELF CARE MNGMENT TRAINING: CPT

## 2023-12-01 PROCEDURE — 6360000002 HC RX W HCPCS: Performed by: INTERNAL MEDICINE

## 2023-12-01 PROCEDURE — 99231 SBSQ HOSP IP/OBS SF/LOW 25: CPT | Performed by: INTERNAL MEDICINE

## 2023-12-01 PROCEDURE — 6370000000 HC RX 637 (ALT 250 FOR IP): Performed by: PHYSICIAN ASSISTANT

## 2023-12-01 PROCEDURE — 99232 SBSQ HOSP IP/OBS MODERATE 35: CPT | Performed by: INTERNAL MEDICINE

## 2023-12-01 PROCEDURE — 80069 RENAL FUNCTION PANEL: CPT

## 2023-12-01 PROCEDURE — 94660 CPAP INITIATION&MGMT: CPT

## 2023-12-01 PROCEDURE — 2580000003 HC RX 258: Performed by: INTERNAL MEDICINE

## 2023-12-01 PROCEDURE — 94761 N-INVAS EAR/PLS OXIMETRY MLT: CPT

## 2023-12-01 PROCEDURE — 85025 COMPLETE CBC W/AUTO DIFF WBC: CPT

## 2023-12-01 PROCEDURE — 36415 COLL VENOUS BLD VENIPUNCTURE: CPT

## 2023-12-01 RX ORDER — PREDNISONE 20 MG/1
40 TABLET ORAL DAILY
Qty: 4 TABLET | Refills: 0 | DISCHARGE
Start: 2023-12-02 | End: 2023-12-04

## 2023-12-01 RX ORDER — FUROSEMIDE 20 MG/1
20 TABLET ORAL DAILY
Qty: 7 TABLET | Refills: 0 | DISCHARGE
Start: 2023-12-01 | End: 2023-12-08

## 2023-12-01 RX ORDER — MAGNESIUM SULFATE IN WATER 40 MG/ML
2000 INJECTION, SOLUTION INTRAVENOUS ONCE
Status: COMPLETED | OUTPATIENT
Start: 2023-12-01 | End: 2023-12-01

## 2023-12-01 RX ORDER — INSULIN LISPRO 100 [IU]/ML
0-8 INJECTION, SOLUTION INTRAVENOUS; SUBCUTANEOUS
DISCHARGE
Start: 2023-12-01

## 2023-12-01 RX ORDER — IPRATROPIUM BROMIDE AND ALBUTEROL SULFATE 2.5; .5 MG/3ML; MG/3ML
3 SOLUTION RESPIRATORY (INHALATION) EVERY 4 HOURS PRN
Qty: 360 ML | DISCHARGE
Start: 2023-12-01

## 2023-12-01 RX ORDER — DILTIAZEM HYDROCHLORIDE 120 MG/1
120 CAPSULE, COATED, EXTENDED RELEASE ORAL DAILY
Qty: 30 CAPSULE | Refills: 3 | DISCHARGE
Start: 2023-12-02

## 2023-12-01 RX ADMIN — PREDNISONE 40 MG: 20 TABLET ORAL at 09:16

## 2023-12-01 RX ADMIN — MIDODRINE HYDROCHLORIDE 5 MG: 5 TABLET ORAL at 09:17

## 2023-12-01 RX ADMIN — INSULIN LISPRO 8 UNITS: 100 INJECTION, SOLUTION INTRAVENOUS; SUBCUTANEOUS at 11:56

## 2023-12-01 RX ADMIN — IPRATROPIUM BROMIDE AND ALBUTEROL SULFATE 1 DOSE: 2.5; .5 SOLUTION RESPIRATORY (INHALATION) at 07:16

## 2023-12-01 RX ADMIN — DILTIAZEM HYDROCHLORIDE 120 MG: 120 CAPSULE, COATED, EXTENDED RELEASE ORAL at 09:16

## 2023-12-01 RX ADMIN — INSULIN GLARGINE 20 UNITS: 100 INJECTION, SOLUTION SUBCUTANEOUS at 09:17

## 2023-12-01 RX ADMIN — FUROSEMIDE 20 MG: 10 INJECTION, SOLUTION INTRAMUSCULAR; INTRAVENOUS at 06:47

## 2023-12-01 RX ADMIN — POTASSIUM BICARBONATE 25 MEQ: 978 TABLET, EFFERVESCENT ORAL at 09:16

## 2023-12-01 RX ADMIN — APIXABAN 5 MG: 5 TABLET, FILM COATED ORAL at 09:16

## 2023-12-01 RX ADMIN — MAGNESIUM SULFATE HEPTAHYDRATE 2000 MG: 40 INJECTION, SOLUTION INTRAVENOUS at 09:21

## 2023-12-01 RX ADMIN — DOCUSATE SODIUM 100 MG: 50 LIQUID ORAL at 09:16

## 2023-12-01 RX ADMIN — INSULIN LISPRO 6 UNITS: 100 INJECTION, SOLUTION INTRAVENOUS; SUBCUTANEOUS at 08:41

## 2023-12-01 RX ADMIN — Medication 10 ML: at 09:17

## 2023-12-01 RX ADMIN — METOPROLOL TARTRATE 25 MG: 25 TABLET, FILM COATED ORAL at 09:16

## 2023-12-01 ASSESSMENT — PAIN SCALES - GENERAL: PAINLEVEL_OUTOF10: 0

## 2023-12-01 NOTE — PROGRESS NOTES
11/30/23 1900   RT Protocol   History Pulmonary Disease 1   Respiratory pattern 0   Breath sounds 2   Cough 2   Indications for Bronchodilator Therapy Decreased or absent breath sounds   Bronchodilator Assessment Score 5

## 2023-12-01 NOTE — CARE COORDINATION
LSW checked [employer] portal which shows patient's pre cert for [facility] is still pending. Notified by Yessenia Luna from [facility] that pre cert was approved. [facility] scheduled for today at 1:00. Nurse, facility, and patient's daughter informed. Daughter expressed concerns about her parent's living conditions at home and asked for advice. She said she has been staying at the home with her mother while patient has been in the hospital. She stated the house is infested with mice and has feces everywhere. She mentioned she has tried calling their old APS worker but has not been able to get a call back. LSW offered to call APS to see if the case is still open and for an updated contact and daughter was agreeable. LSW also suggested that daughter call wife's PCP office to request an order for hhc while patient is at [facility]. Call made to APS who said previous case had been closed and former  no longer worked there. Intake advised daughter to call and make a new report. This information was provided to patient's daughter in addition to the intake phone number.

## 2023-12-01 NOTE — PROGRESS NOTES
12/01/23 0800   RT Protocol   History Pulmonary Disease 1   Respiratory pattern 0   Breath sounds 2   Cough 0   Indications for Bronchodilator Therapy Decreased or absent breath sounds   Bronchodilator Assessment Score 3

## 2023-12-01 NOTE — DISCHARGE INSTR - DIET
Good nutrition is important when healing from an illness, injury, or surgery. Follow any nutrition recommendations given to you during your hospital stay. If you were given an oral nutrition supplement while in the hospital, continue to take this supplement at home. You can take it with meals, in-between meals, and/or before bedtime. These supplements can be purchased at most local grocery stores, pharmacies, and chain [employer]-stores. If you have any questions about your diet or nutrition, call the hospital and ask for the dietitian. You are currently ordered an Adult Diet; Dysphagia-Pureed; 5 Carb choices (75gm/meal) No Added Salt (3-4 gm); Mildly Thick (Nectar); No concentrated sweets. Adult Oral Nutrition Supplement; Lunch, Dinner; Frozen Oral Supplement.

## 2023-12-01 NOTE — PROGRESS NOTES
11/30/23 1900   RT Protocol   History Pulmonary Disease 1   Respiratory pattern 0   Breath sounds 2   Cough 0   Indications for Bronchodilator Therapy Decreased or absent breath sounds   Bronchodilator Assessment Score 3

## 2023-12-01 NOTE — PROGRESS NOTES
Physician Progress Note      PATIENT:               Norma Olmedo  CSN #:                  [id number]  :                       1940  ADMIT DATE:       2023 12:57 PM  [address] DATE:  RESPONDING  PROVIDER #:        Marylene Precise MD          QUERY TEXT:    Patient admitted with Hypoglycemic encephalopathy with respiratory failure, A   fib with RVR  and has CHF documented. IV Lasix 20 mg ordered on 23, BNP   6572. ECHO 23: visually estimated EF of 50 - 55%. If possible, please   document in progress notes and discharge summary further specificity regarding   the type and acuity of CHF:    The medical record reflects the following:  Risk Factors: 80 yom, acute hypoxic respiratory failure, COPD possible exac,   volume overload, Acute encephalopathy, A fib  Clinical Indicators: CXR 23: Cardiomegaly and faint right perihilar   opacity. This may represent edema or developing pneumonia ICU consult note   23 Dr Yoana Vega: \"Volume overload\". BNP 23  6572. 23  Echo   (TTE) complete (PRN contrast/bubble/strain/3D) Result Date: 2023 -    Left Ventricle: Normal left ventricular systolic function with a visually   estimated EF of 50 - 55%. Left ventricle size is normal. Normal wall   thickness. Normal wall motion. Normal diastolic function.-  Aortic Valve:   Severely calcified cusp. Mild stenosis of the aortic valve.   Treatment: ICU admit, Echo   Metoprolol, Eliquis, Cardizem drip,  Lasix 20 mg   IV twice a day,  Cardiology, GI, Neurology, Intensivist consult    Thank  you  Nevin AMES RN CDS  [phone] M-F 6a-2pm  Options provided:  -- Acute on Chronic Systolic CHF/HFrEF  -- Acute on Chronic Diastolic CHF/HFpEF  -- Acute on Chronic Systolic and Diastolic CHF  -- Chronic Systolic CHF/HFrEF  -- Chronic Diastolic CHF/HFpEF  -- Chronic Systolic and Diastolic CHF  -- Other - I will add my own diagnosis  -- Disagree - Not applicable / Not valid  -- Disagree -

## 2023-12-01 NOTE — PROGRESS NOTES
Physical Therapy Med Surg Daily Treatment Note  Facility/Department: [address] [place] [address]  Room: [place] [address]       NAME: Rachel Zapata  : 1940 (28 y.o.)  MRN: [id number]  CODE STATUS: Full Code    Date of Service: 2023    Patient Diagnosis(es): Respiratory distress [R06.03]  Hypoglycemia [E16.2]  Atrial fibrillation with rapid ventricular response ([address]) [I48.91]  Altered mental status, unspecified altered mental status type [R41.82]  Respiratory failure with hypoxia, unspecified chronicity ([address]) [J96.91]   Chief Complaint   Patient presents with    Altered Mental Status     Pt found unresponsive     Patient Active Problem List    Diagnosis Date Noted    Altered mental status 2023    Persistent atrial fibrillation ([address]) 2018    High risk medication use 2018    Pulmonary hypertension ([address]) 2018    Obesity due to excess calories 2018    Coronary artery disease involving native coronary artery 2018    Atrial fibrillation with rapid ventricular response ([address]) 10/19/2022    Encephalopathy 2023    Hypoglycemia due to type 2 diabetes mellitus ([address]) 2023    Respiratory failure with hypoxia ([address]) 2023    Type 2 diabetes mellitus with hypoglycemia and coma, with long-term current use of insulin ([address]) 3943    Acute metabolic encephalopathy     Hypoglycemia 2023    Respiratory distress 2023        Past Medical History:   Diagnosis Date    CAD (coronary artery disease)     CHF (congestive heart failure) ([address])     Diabetes mellitus ([address])     Hyperlipidemia     Hypertension      Past Surgical History:   Procedure Laterality Date    COLONOSCOPY         Chart Reviewed: Yes  Family / Caregiver Present: No    Restrictions:  Restrictions/Precautions: Fall Risk    SUBJECTIVE:   Subjective: Pt agreeable to tx. Pain  Pain: Pt denies pain pre and post session.     OBJECTIVE:        Bed mobility  Supine to Sit: Maximum assistance  Sit to Supine: Moderate assistance  Bed Mobility Comments: HOB slightly elevated with use of HHA and bed rails. Increase time and effort to complete. Transfers  Comment: Did not attempt d/t poor sitting balance. PT Exercises  Static Sitting Balance Exercises: With and without UE support. Pt able to maintain posture for ~20 sec before displaying a retro lean. Multiple attempts completed          Activity Tolerance  Activity Tolerance: Patient limited by fatigue          ASSESSMENT   Assessment: Pt demonstrated poor trunk and UE strength to assist with bed mobility. Sitting EOB pt displayed a retro lean and unable to maintain midline for more than 20 sec. Did not attempt transfers d/t poor sitting balance. Discharge Recommendations:  Continue to assess pending progress         Goals  Short Term Goals  Time Frame for Short Term Goals: 4 weeks  Short Term Goal 1: Bed mobility min A  Short Term Goal 2: Transers min A bed to chair with AD  Short Term Goal 3: Initiate ambulation when appropriate/safe and pt following safety commands. Short Term Goal 4: Pt to tolerate supine and seated ex's 10 min with self support EOB to improve overall balance and strength to attain goals. Patient Goals   Patient Goals : Return dillan    PLAN    General Plan: 1 time a day 3-6 times a week  Safety Devices  Type of Devices: All fall risk precautions in place, Call light within reach, Left in bed, Bed alarm in place     [facility] (6 CLICK) [address] without Stair Climbing Raw Score : 8     Therapy Time   Individual   Time In 1039   Time Out 1056   Minutes 17      Bm: 10   Therex: 7       ROSARIO HOFF PTA, 12/01/23 at 11:53 AM         Definitions for assistance levels  Independent = pt does not require any physical supervision or assistance from another person for activity completion. Device may be needed.   Stand by assistance = pt requires verbal cues or instructions from another person, close to but not

## 2023-12-01 NOTE — DISCHARGE INSTR - COC
Continuity of Care Form    Patient Name: Carl Collazo   :  1940  MRN:  [id number]    [address] date:  2023  Discharge date:  23    Code Status Order: Full Code   Advance Directives:     Admitting Physician:  Geovani Sutton MD  PCP: Asher Marshall, APRN - CNP    Discharging Nurse: David Feliz [address] Unit/Room#: S872/R430-62  Discharging Unit Phone Number: [phone]    Emergency Contact:   Extended Emergency Contact Information  Primary Emergency Contact: Malia Navarrete   [facility] [address] Phone: [phone]  Work Phone: [phone]  Mobile Phone: [phone]  Relation: Child    Past Surgical History:  Past Surgical History:   Procedure Laterality Date    COLONOSCOPY         Immunization History:   Immunization History   Administered Date(s) Administered    COVID-19, PFIZER Bivalent, DO NOT Dilute, (age 12y+), IM, 27 mcg/0.3 mL 2022    COVID-19, PFIZER GRAY top, DO NOT Dilute, (age 15 y+), IM, 30 mcg/0.3 mL 2022    COVID-19, PFIZER PURPLE top, DILUTE for use, (age 15 y+), 30mcg/0.3mL 2021    COVID-19, PFIZER, ( formula), (age 12y+), IM, 30mcg/0.3mL 10/23/2023       Active Problems:  Patient Active Problem List   Diagnosis Code    Persistent atrial fibrillation ([address]) I48.19    High risk medication use Z79.899    Pulmonary hypertension ([address]) I27.20    Obesity due to excess calories E66.09    Coronary artery disease involving native coronary artery I25.10    Atrial fibrillation with rapid ventricular response (HCC) I48.91    Respiratory distress R06.03    Altered mental status T17.31    Acute metabolic encephalopathy F81.51    Hypoglycemia E16.2    Type 2 diabetes mellitus with hypoglycemia and coma, with long-term current use of insulin (HCC) E11.641, Z79.4    Hypoglycemia due to type 2 diabetes mellitus ([address]) E11.649    Respiratory failure with hypoxia ([address]) J96.91    Encephalopathy G93.40       Isolation/Infection:   Isolation

## 2023-12-02 NOTE — DISCHARGE SUMMARY
[facility] Medicine Discharge Summary    Elen Li  :  1940  MRN:  [id number]    Admit date:  2023    Discharge date:  2023    Admitting Physician:  Tresa Cortés MD  Primary Care Physician:  Claudia Aguirre, APRN - CNP    Discharge Diagnoses:    Principal Problem:    Respiratory distress  Active Problems:    Altered mental status    Acute metabolic encephalopathy    Hypoglycemia    Type 2 diabetes mellitus with hypoglycemia and coma, with long-term current use of insulin (HCC)    Hypoglycemia due to type 2 diabetes mellitus ([address])    Respiratory failure with hypoxia (HCC)    Encephalopathy  Resolved Problems:    * No resolved hospital problems. *    Chief Complaint   Patient presents with    Altered Mental Status     Pt found unresponsive       Condition: improved   Activity: no strenuous activity   Diet: diabetic  Disposition: SNF  Functional Status: ambulatory with assistance    Significant Findings:     Recent Labs     23  0452 23  0446 23  0807 23  0541   WBC 7.5 5.3  --  6.5   HGB 12.0* 11.6* 12.3* 11.3*   HCT 37.6* 36.1*  --  35.7*   MCV 91.9 91.2  --  93.2*    182  --  183         Latest Ref Rng & Units 2023     4:52 AM 2023     4:46 AM 2023     5:41 AM 2023     5:13 AM 2023     9:10 AM   Labs Renal   BUN 8 - 23 mg/dL 11  12  10  11  12    Cr 0.70 - 1.20 mg/dL 0.55  0.51  0.49  0.58  0.63    K 3.4 - 4.9 mEq/L 3.8  3.9  3.8  3.7  3.3    Na 135 - 144 mEq/L 136  140  141  141  141          Hospital Course:   80-year-old male with diabetes, atrial fibrillation, and suspected underlying dementia with medical noncompliance presented to the hospital after being found down in his home by his daughter.   He was found to have metabolic encephalopathy secondary to prolonged hypoglycemia with course complicated by atrial fibrillation with RVR and acute respiratory failure with hypoxia secondary to volume

## 2023-12-04 ENCOUNTER — OFFICE VISIT (OUTPATIENT)
Dept: GERIATRIC MEDICINE | Age: 83
End: 2023-12-04

## 2023-12-04 DIAGNOSIS — M62.81 MUSCLE WEAKNESS (GENERALIZED): ICD-10-CM

## 2023-12-04 DIAGNOSIS — E11.641 TYPE 2 DIABETES MELLITUS WITH HYPOGLYCEMIA AND COMA, WITH LONG-TERM CURRENT USE OF INSULIN (HCC): Primary | ICD-10-CM

## 2023-12-04 DIAGNOSIS — G93.41 ACUTE METABOLIC ENCEPHALOPATHY: ICD-10-CM

## 2023-12-04 DIAGNOSIS — Z79.4 TYPE 2 DIABETES MELLITUS WITH HYPOGLYCEMIA AND COMA, WITH LONG-TERM CURRENT USE OF INSULIN (HCC): Primary | ICD-10-CM

## 2023-12-04 NOTE — PROGRESS NOTES
Physical Therapy  [facility] G702/N723-82  Physical Therapy Discharge      NAME: Kulwinder Nogueira    : 1940 (77 y.o.)  MRN: [id number]    Account: [de-identified]  Gender: male      Patient has been discharged from acute care hospital. DC patient from current PT program.      Electronically signed by Aamir Rivera PT on 23 at 11:50 AM EST

## 2023-12-06 ENCOUNTER — OFFICE VISIT (OUTPATIENT)
Dept: GERIATRIC MEDICINE | Age: 83
End: 2023-12-06

## 2023-12-06 DIAGNOSIS — D72.829 LEUKOCYTOSIS, UNSPECIFIED TYPE: ICD-10-CM

## 2023-12-06 DIAGNOSIS — E11.649 HYPOGLYCEMIA DUE TO TYPE 2 DIABETES MELLITUS (HCC): ICD-10-CM

## 2023-12-06 DIAGNOSIS — G93.40 ENCEPHALOPATHY: ICD-10-CM

## 2023-12-06 DIAGNOSIS — M62.81 MUSCLE WEAKNESS (GENERALIZED): ICD-10-CM

## 2023-12-06 DIAGNOSIS — I48.19 PERSISTENT ATRIAL FIBRILLATION (HCC): Primary | ICD-10-CM

## 2023-12-08 RX ORDER — ACETAMINOPHEN 325 MG/1
325 TABLET ORAL EVERY 6 HOURS PRN
COMMUNITY
Start: 2023-12-01

## 2023-12-08 RX ORDER — PREDNISONE 20 MG/1
40 TABLET ORAL 2 TIMES DAILY
COMMUNITY
Start: 2023-12-01

## 2023-12-08 RX ORDER — ACETAMINOPHEN 650 MG/1
650 SUPPOSITORY RECTAL EVERY 4 HOURS PRN
COMMUNITY
Start: 2023-12-01

## 2023-12-18 ENCOUNTER — OFFICE VISIT (OUTPATIENT)
Dept: GERIATRIC MEDICINE | Age: 83
End: 2023-12-18

## 2023-12-18 DIAGNOSIS — I48.19 PERSISTENT ATRIAL FIBRILLATION (HCC): Primary | ICD-10-CM

## 2023-12-18 DIAGNOSIS — Z79.4 TYPE 2 DIABETES MELLITUS WITH HYPOGLYCEMIA AND COMA, WITH LONG-TERM CURRENT USE OF INSULIN (HCC): ICD-10-CM

## 2023-12-18 DIAGNOSIS — E11.641 TYPE 2 DIABETES MELLITUS WITH HYPOGLYCEMIA AND COMA, WITH LONG-TERM CURRENT USE OF INSULIN (HCC): ICD-10-CM

## 2023-12-18 DIAGNOSIS — M62.81 MUSCLE WEAKNESS (GENERALIZED): ICD-10-CM

## 2023-12-27 ENCOUNTER — HOSPITAL ENCOUNTER (OUTPATIENT)
Dept: CT IMAGING | Age: 83
Discharge: HOME | End: 2023-12-29
Attending: INTERNAL MEDICINE
Payer: MEDICARE

## 2023-12-27 DIAGNOSIS — R93.89 ABNORMAL CHEST X-RAY: ICD-10-CM

## 2023-12-27 PROCEDURE — 71250 CT THORAX DX C-: CPT

## 2023-12-28 PROBLEM — M62.81 MUSCLE WEAKNESS (GENERALIZED): Status: ACTIVE | Noted: 2023-12-28

## 2023-12-28 NOTE — PROGRESS NOTES
K/uL 0.15-1.10  Final             EOS (ABSOLUTE) 0.20 K/uL 0.20-0.80  Final     12/4/23  GLUCOSE 74 mg/dL   Final      GLUCOSE, FASTING         65-99  mg/dL                               GLUCOSE, NON-FASTING      mg/dL        SODIUM 143 mEq/L 136-145  Final             POTASSIUM 3.8 mEq/L 3.5-5.3  Final             CHLORIDE 97 mEq/L  L Final             CARBON DIOXIDE (CO2) 32 mEq/L 21-33  Final             BUN (UREA NITROGEN) 24 mg/dL 7-25  Final             CREATININE 0.6 mg/dL 0.6-1.2  Final             BUN/CREATININE RATIO 40  6-22 H Final             GFR- 156 mL/min/1.73 m2 >60  Final             GFR-NON-AFRICAN AMERICAN 129 mL/min/1.73 m2 >60  Final          Stage of CKD     eGFR (mL/min/1.73 square meters)          Stage 1        >/= 90        or > 90          Stage 2        60 - 89          Stage 3        30 - 59          Stage 4        15 - 29          Stage 5        </= 14        or < 15  ** GFR is reliable for adults 17 to 69 years with stable kidney      function. CALCIUM 9.4 mg/dL 8.4-10.2  Final       HgBA1c:    Lab Results   Component Value Date/Time    LABA1C 5.2 11/24/2023 06:21 PM     Time based visit:  time spent 30 minutes. Reviewed with the patient: current clinical status, medications, activities and diet. Side effects, adverse effects of the medication prescribed, as well as treatment plan and result expectations. Discussed diagnostic results, other suggested diagnostic testing, prognosis, risk and benefits of treatment options, importance of compliance with treatment options with resident and nursing staff. Reviewed therapy plan and progress with therapists. Reviewed medical record, labs, medications, etc.      I have reviewed the patient's medical history in detail and updated the computerized patient record.     This note was partially generated using Dragon voice recognition system, and there may be some incorrect words, spellings, punctuation that

## 2023-12-29 NOTE — PROGRESS NOTES
Patient Name: Mitch Alfonso  Date: 12/29/2023  YOB: 1940  Medical Record Number: [id number]              History of Present Illness:      Review of Systems    Review of Systems: All 14 review of systems negative other than as stated above    Social History     Tobacco Use    Smoking status: Former    Smokeless tobacco: Never   Vaping Use    Vaping Use: Never used   Substance Use Topics    Alcohol use: No    Drug use: No         Past Medical History:   Diagnosis Date    CAD (coronary artery disease)     CHF (congestive heart failure) ([address])     Diabetes mellitus ([address])     Hyperlipidemia     Hypertension            Past Surgical History:   Procedure Laterality Date    COLONOSCOPY           Current Outpatient Medications on File Prior to Visit   Medication Sig Dispense Refill    acetaminophen (TYLENOL) 650 MG suppository Place 1 suppository rectally every 4 hours as needed for Fever or Pain      acetaminophen (TYLENOL) 325 MG tablet Take 1 tablet by mouth every 6 hours as needed for Pain or Fever      predniSONE (DELTASONE) 20 MG tablet Take 2 tablets by mouth 2 times daily (Patient not taking: Reported on 12/19/2023)      dilTIAZem (CARDIZEM CD) 120 MG extended release capsule Take 1 capsule by mouth daily (Patient taking differently: Take 1 capsule by mouth daily Indications: High Blood Pressure Disorder) 30 capsule 3    insulin lispro (HUMALOG) 100 UNIT/ML SOLN injection vial Inject 0-8 Units into the skin 4 times daily (before meals and nightly)      ipratropium 0.5 mg-albuterol 2.5 mg (DUONEB) 0.5-2.5 (3) MG/3ML SOLN nebulizer solution Inhale 3 mLs into the lungs every 4 hours as needed for Shortness of Breath 360 mL     metoprolol tartrate (LOPRESSOR) 25 MG tablet Take 1 tablet by mouth 2 times daily 60 tablet 3    furosemide (LASIX) 20 MG tablet Take 1 tablet by mouth daily for 7 days 7 tablet 0    carboxymethylcellulose PF (REFRESH PLUS) 0.5 % SOLN ophthalmic solution Place 1 drop into both

## 2024-01-01 ENCOUNTER — OFFICE VISIT (OUTPATIENT)
Dept: GERIATRIC MEDICINE | Age: 84
End: 2024-01-01

## 2024-01-01 ENCOUNTER — OFFICE VISIT (OUTPATIENT)
Dept: GERIATRIC MEDICINE | Age: 84
End: 2024-01-01
Payer: MEDICARE

## 2024-01-01 DIAGNOSIS — Z79.4 TYPE 2 DIABETES MELLITUS WITH HYPOGLYCEMIA AND COMA, WITH LONG-TERM CURRENT USE OF INSULIN (HCC): ICD-10-CM

## 2024-01-01 DIAGNOSIS — I48.19 PERSISTENT ATRIAL FIBRILLATION (HCC): Primary | ICD-10-CM

## 2024-01-01 DIAGNOSIS — M62.81 MUSCLE WEAKNESS (GENERALIZED): ICD-10-CM

## 2024-01-01 DIAGNOSIS — Z79.4 TYPE 2 DIABETES MELLITUS WITH HYPOGLYCEMIA AND COMA, WITH LONG-TERM CURRENT USE OF INSULIN (HCC): Primary | ICD-10-CM

## 2024-01-01 DIAGNOSIS — E11.641 TYPE 2 DIABETES MELLITUS WITH HYPOGLYCEMIA AND COMA, WITH LONG-TERM CURRENT USE OF INSULIN (HCC): Primary | ICD-10-CM

## 2024-01-01 DIAGNOSIS — I27.20 PULMONARY HYPERTENSION (HCC): ICD-10-CM

## 2024-01-01 DIAGNOSIS — E11.641 TYPE 2 DIABETES MELLITUS WITH HYPOGLYCEMIA AND COMA, WITH LONG-TERM CURRENT USE OF INSULIN (HCC): ICD-10-CM

## 2024-01-01 PROCEDURE — G8484 FLU IMMUNIZE NO ADMIN: HCPCS | Performed by: NURSE PRACTITIONER

## 2024-01-01 PROCEDURE — 99308 SBSQ NF CARE LOW MDM 20: CPT | Performed by: NURSE PRACTITIONER

## 2024-01-01 PROCEDURE — G9687 HOSPICE ANYTIME MSMT PER: HCPCS | Performed by: NURSE PRACTITIONER

## 2024-01-01 PROCEDURE — G9692 HOSP RECD BY PT DUR MSMT PER: HCPCS | Performed by: NURSE PRACTITIONER

## 2024-01-12 PROBLEM — Z51.5 HOSPICE CARE: Status: ACTIVE | Noted: 2024-01-01

## 2024-01-17 ENCOUNTER — TELEMEDICINE (OUTPATIENT)
Dept: PULMONOLOGY | Age: 84
End: 2024-01-17

## 2024-01-17 DIAGNOSIS — I27.20 PULMONARY HYPERTENSION (HCC): ICD-10-CM

## 2024-01-17 DIAGNOSIS — J44.9 CHRONIC OBSTRUCTIVE PULMONARY DISEASE, UNSPECIFIED COPD TYPE (HCC): Primary | ICD-10-CM

## 2024-01-17 ASSESSMENT — ENCOUNTER SYMPTOMS
EYES NEGATIVE: 1
GASTROINTESTINAL NEGATIVE: 1

## 2024-01-17 NOTE — PROGRESS NOTES
2024    TELEHEALTH EVALUATION -- Audio/Visual (During COVID-19 public health emergency)    Due to COVID 19 outbreak, patient's office visit was converted to a virtual visit.  Patient was contacted and agreed to proceed with a virtual visit via Telephone Visit  The risks and benefits of converting to a virtual visit were discussed in light of the current infectious disease epidemic.  Patient also understood that insurance coverage and co-pays are up to their individual insurance plans.    HPI:    Richar Navarrete (:  1940) has requested an audio/video evaluation for the following concern(s):    2024  Spoke with patient and his daughter, he is currently at nursing home, he enrolled in hospice, has no respiratory symptoms, no coughing, no chest pain and no shortness of breath, no lower extremity edema, he has been losing weight mainly because he has not been eating.  No fever no chills.    Review of Systems   Constitutional: Negative.    HENT: Negative.     Eyes: Negative.    Gastrointestinal: Negative.    Endocrine: Negative.    Musculoskeletal: Negative.    Skin: Negative.    Neurological: Negative.    Hematological: Negative.    Psychiatric/Behavioral: Negative.         Prior to Visit Medications    Medication Sig Taking? Authorizing Provider   acetaminophen (TYLENOL) 650 MG suppository Place 1 suppository rectally every 4 hours as needed for Fever or Pain  ProviderTy MD   acetaminophen (TYLENOL) 325 MG tablet Take 1 tablet by mouth every 6 hours as needed for Pain or Fever  Ty Escobar MD   predniSONE (DELTASONE) 20 MG tablet Take 2 tablets by mouth 2 times daily  Patient not taking: Reported on 2023  ProviderTy MD   dilTIAZem (CARDIZEM CD) 120 MG extended release capsule Take 1 capsule by mouth daily  Patient taking differently: Take 1 capsule by mouth daily Indications: High Blood Pressure Disorder  Dimitris Salazar, DO   insulin lispro (HUMALOG) 100

## 2024-02-02 NOTE — PROGRESS NOTES
SUBJECTIVE:  This 83-year-old gentleman seen for visit for his A-fib RVR weakness patient new to follow-up with no change in oral intake noticeably diathesis.  No recent falls acute psychosis.      ROS: Coughing intermittently  The rest of the 14 point ROS negative    PHYSICAL EXAM: VSS per facility record  Alert to self pupils reactive oral mucosa moist chest with coarse breath sounds cardiovascular showed a regular rate abdomen soft nontender, trace edema    ASSESSMENT & PLAN:   Diagnosis Orders   1. Persistent atrial fibrillation ([facility])        2. 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 Muscle weakness (generalized)          Blood pressure stable this time.  Has been insulin therapy follow-up A1c is pending.  Has been on chronic steroid.  Remains anticoagulated bleeding diathesis.  Is on a beta-blocker function stable            Past Medical History:   Diagnosis Date    CAD (coronary artery disease)     CHF (congestive heart failure) ([facility])     Diabetes mellitus ([facility])     Hyperlipidemia     Hypertension          Past Surgical History:   Procedure Laterality Date    COLONOSCOPY           Current Outpatient Medications on File Prior to Visit   Medication Sig Dispense Refill    acetaminophen (TYLENOL) 650 MG suppository Place 1 suppository rectally every 4 hours as needed for Fever or Pain      acetaminophen (TYLENOL) 325 MG tablet Take 1 tablet by mouth every 6 hours as needed for Pain or Fever      predniSONE (DELTASONE) 20 MG tablet Take 2 tablets by mouth 2 times daily (Patient not taking: Reported on 12/19/2023)      furosemide (LASIX) 20 MG tablet Take 1 tablet by mouth daily for 7 days 7 tablet 0    [DISCONTINUED] dilTIAZem (CARDIZEM CD) 120 MG extended release capsule Take 1 capsule by mouth daily (Patient taking differently: Take 1 capsule by mouth daily Indications: High Blood Pressure Disorder) 30 capsule 3    [DISCONTINUED] insulin lispro

## 2024-02-15 NOTE — PROGRESS NOTES
Name: Richar Navarrete   Facility: [address] [place]  [address]  Date: 1/15/2024     Subjective:     Chief Complaint   Patient presents with    1 Month Follow-Up       HPI  Richar Navarrete is a 83 y.o. male being seen today for evaluation of type 2 diabetes and hypertension.  Resident is lying in the bed, elderly, chronically ill-appearing, in no acute distress.  He is alert and oriented to person and place, cooperative with care.  Nursing staff report low blood pressures for the past 24 to 48 hours, blood pressure 93/57 and heart rate 70 this morning.  Resident denies lightheadedness dizziness but does complain of fatigue and weakness, which is his baseline.  Blood sugar this morning was 71 and this afternoon was 170, is on Lantus metformin and Actos.  He has a history of atrial fibrillation, heart rate is controlled on current medications.  Weight is stable at 190.3 pounds, appetite is fair.  Will adjust antihypertensives.    Past Medical History:   Diagnosis Date    CAD (coronary artery disease)     CHF (congestive heart failure) ([facility])     Diabetes mellitus ([facility])     Hyperlipidemia     Hypertension          Allergies:  Reviewed in nursing facility records  Medications:  Reviewed and reconciled in nursing facility records    Review of Systems  A 10 Point review of systems was performed and is negative unless previously stated      Objective:   See nursing facility record for vital signs.      Physical Exam  Constitutional: In bed, in no acute distess  Pulmonary/Chest:  lung sounds clear and diminished in lower lobes, no shortness of breath  Cardiovascular:  S1-S2 regular, no edema  Abd/GI:  abdomen soft, round, active bowel sounds x 4 quadrants  MS/Extremities:  CUEVA, ROM limited, abnormal gait  Psych:  A/O x 2, cooperative with care      Diagnosis Orders   1. 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 Pulmonary